# Patient Record
Sex: FEMALE | Race: WHITE | Employment: OTHER | URBAN - METROPOLITAN AREA
[De-identification: names, ages, dates, MRNs, and addresses within clinical notes are randomized per-mention and may not be internally consistent; named-entity substitution may affect disease eponyms.]

---

## 2017-03-28 ENCOUNTER — OFFICE VISIT (OUTPATIENT)
Dept: ORTHOPEDIC SURGERY | Age: 57
End: 2017-03-28

## 2017-03-28 DIAGNOSIS — M77.11 LATERAL EPICONDYLITIS OF RIGHT ELBOW: Primary | ICD-10-CM

## 2017-03-28 DIAGNOSIS — M25.521 RIGHT ELBOW PAIN: ICD-10-CM

## 2017-03-28 PROCEDURE — G8427 DOCREV CUR MEDS BY ELIG CLIN: HCPCS | Performed by: ORTHOPAEDIC SURGERY

## 2017-03-28 PROCEDURE — 20605 DRAIN/INJ JOINT/BURSA W/O US: CPT | Performed by: ORTHOPAEDIC SURGERY

## 2017-03-28 PROCEDURE — 3017F COLORECTAL CA SCREEN DOC REV: CPT | Performed by: ORTHOPAEDIC SURGERY

## 2017-03-28 PROCEDURE — 4004F PT TOBACCO SCREEN RCVD TLK: CPT | Performed by: ORTHOPAEDIC SURGERY

## 2017-03-28 PROCEDURE — G8421 BMI NOT CALCULATED: HCPCS | Performed by: ORTHOPAEDIC SURGERY

## 2017-03-28 PROCEDURE — G8484 FLU IMMUNIZE NO ADMIN: HCPCS | Performed by: ORTHOPAEDIC SURGERY

## 2017-03-28 PROCEDURE — 73070 X-RAY EXAM OF ELBOW: CPT | Performed by: ORTHOPAEDIC SURGERY

## 2017-03-28 PROCEDURE — 3014F SCREEN MAMMO DOC REV: CPT | Performed by: ORTHOPAEDIC SURGERY

## 2017-03-28 PROCEDURE — 99203 OFFICE O/P NEW LOW 30 MIN: CPT | Performed by: ORTHOPAEDIC SURGERY

## 2017-03-28 RX ORDER — LISINOPRIL AND HYDROCHLOROTHIAZIDE 20; 12.5 MG/1; MG/1
TABLET ORAL
COMMUNITY
Start: 2017-02-10 | End: 2019-01-29

## 2017-03-28 RX ORDER — CELECOXIB 200 MG/1
CAPSULE ORAL
COMMUNITY
Start: 2017-02-10 | End: 2019-01-29

## 2017-03-28 RX ORDER — CHOLECALCIFEROL (VITAMIN D3) 125 MCG
5 CAPSULE ORAL DAILY
COMMUNITY

## 2017-03-28 RX ORDER — LEVOTHYROXINE SODIUM 0.05 MG/1
TABLET ORAL
COMMUNITY
Start: 2017-02-10

## 2017-03-28 RX ORDER — ATORVASTATIN CALCIUM 40 MG/1
TABLET, FILM COATED ORAL
COMMUNITY
Start: 2017-02-10

## 2017-03-28 RX ORDER — LORAZEPAM 1 MG/1
TABLET ORAL
COMMUNITY
Start: 2017-03-23 | End: 2019-01-29

## 2017-03-28 RX ORDER — AMITRIPTYLINE HYDROCHLORIDE 50 MG/1
TABLET, FILM COATED ORAL
COMMUNITY
Start: 2017-02-21

## 2017-03-28 RX ORDER — ZONISAMIDE 100 MG/1
CAPSULE ORAL
COMMUNITY
Start: 2017-02-12

## 2017-03-28 RX ORDER — LANSOPRAZOLE 30 MG/1
CAPSULE, DELAYED RELEASE ORAL
COMMUNITY
Start: 2017-02-10

## 2017-03-28 RX ORDER — GLUCOSA SU 2KCL/CHONDROITIN SU 500-400 MG
CAPSULE ORAL
COMMUNITY

## 2017-03-28 RX ORDER — OMEPRAZOLE 20 MG/1
20 CAPSULE, DELAYED RELEASE ORAL DAILY
COMMUNITY

## 2017-03-28 RX ORDER — GABAPENTIN 800 MG/1
TABLET ORAL
COMMUNITY
Start: 2017-02-21

## 2017-03-28 RX ORDER — SUMATRIPTAN 100 MG/1
TABLET, FILM COATED ORAL
COMMUNITY
Start: 2017-03-15

## 2017-03-28 RX ORDER — HYDROCODONE BITARTRATE AND ACETAMINOPHEN 7.5; 325 MG/1; MG/1
TABLET ORAL
COMMUNITY
Start: 2017-03-23 | End: 2019-01-29

## 2017-03-28 RX ORDER — ASCORBIC ACID 500 MG
500 TABLET ORAL DAILY
COMMUNITY

## 2017-03-28 RX ORDER — PROMETHAZINE HYDROCHLORIDE 25 MG/1
TABLET ORAL
COMMUNITY
Start: 2017-03-15

## 2019-01-29 ENCOUNTER — APPOINTMENT (OUTPATIENT)
Dept: GENERAL RADIOLOGY | Age: 59
End: 2019-01-29
Payer: COMMERCIAL

## 2019-01-29 ENCOUNTER — HOSPITAL ENCOUNTER (EMERGENCY)
Age: 59
Discharge: HOME | End: 2019-01-29
Payer: COMMERCIAL

## 2019-01-29 VITALS
SYSTOLIC BLOOD PRESSURE: 162 MMHG | HEART RATE: 107 BPM | BODY MASS INDEX: 44.3 KG/M2 | DIASTOLIC BLOOD PRESSURE: 81 MMHG | OXYGEN SATURATION: 94 % | HEIGHT: 63 IN | WEIGHT: 250 LBS | RESPIRATION RATE: 18 BRPM | TEMPERATURE: 98 F

## 2019-01-29 DIAGNOSIS — S46.912A STRAIN OF LEFT SHOULDER, INITIAL ENCOUNTER: ICD-10-CM
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
## 2019-01-29 PROCEDURE — 73030 X-RAY EXAM OF SHOULDER: CPT

## 2019-01-29 PROCEDURE — 99283 EMERGENCY DEPT VISIT LOW MDM: CPT

## 2019-01-29 RX ORDER — MAGNESIUM 30 MG
30 TABLET ORAL 2 TIMES DAILY
COMMUNITY

## 2019-01-29 RX ORDER — DOCUSATE SODIUM 100 MG/1
100 CAPSULE, LIQUID FILLED ORAL 2 TIMES DAILY
COMMUNITY

## 2019-01-29 RX ORDER — ASPIRIN 325 MG
325 TABLET ORAL DAILY
COMMUNITY

## 2019-01-29 RX ORDER — BUSPIRONE HYDROCHLORIDE 15 MG/1
7.5 TABLET ORAL DAILY
COMMUNITY

## 2019-01-29 RX ORDER — TIZANIDINE 2 MG/1
TABLET ORAL
COMMUNITY
Start: 2018-04-26

## 2019-01-29 ASSESSMENT — PAIN DESCRIPTION - ORIENTATION: ORIENTATION: LEFT

## 2019-01-29 ASSESSMENT — PAIN DESCRIPTION - LOCATION
LOCATION: BACK
LOCATION: NECK;SHOULDER

## 2019-01-29 ASSESSMENT — PAIN SCALES - GENERAL
PAINLEVEL_OUTOF10: 1
PAINLEVEL_OUTOF10: 3

## 2019-01-29 ASSESSMENT — PAIN DESCRIPTION - FREQUENCY: FREQUENCY: INTERMITTENT

## 2024-01-26 ENCOUNTER — HOSPITAL ENCOUNTER (EMERGENCY)
Age: 64
Discharge: HOME | End: 2024-01-26
Payer: MEDICARE

## 2024-01-26 VITALS
WEIGHT: 231 LBS | DIASTOLIC BLOOD PRESSURE: 56 MMHG | HEART RATE: 100 BPM | OXYGEN SATURATION: 100 % | TEMPERATURE: 98.2 F | BODY MASS INDEX: 40.93 KG/M2 | RESPIRATION RATE: 16 BRPM | HEIGHT: 63 IN | SYSTOLIC BLOOD PRESSURE: 107 MMHG

## 2024-01-26 DIAGNOSIS — S39.012D BACK STRAIN, SUBSEQUENT ENCOUNTER: Primary | ICD-10-CM

## 2024-01-26 DIAGNOSIS — R11.0 NAUSEA: ICD-10-CM

## 2024-01-26 LAB
FLUAV RNA RESP QL NAA+PROBE: NOT DETECTED
FLUBV RNA RESP QL NAA+PROBE: NOT DETECTED
SARS-COV-2 RNA RESP QL NAA+PROBE: NOT DETECTED

## 2024-01-26 PROCEDURE — 87636 SARSCOV2 & INF A&B AMP PRB: CPT

## 2024-01-26 PROCEDURE — 96374 THER/PROPH/DIAG INJ IV PUSH: CPT

## 2024-01-26 PROCEDURE — 2580000003 HC RX 258: Performed by: PHYSICIAN ASSISTANT

## 2024-01-26 PROCEDURE — 6370000000 HC RX 637 (ALT 250 FOR IP): Performed by: PHYSICIAN ASSISTANT

## 2024-01-26 PROCEDURE — 6360000002 HC RX W HCPCS: Performed by: PHYSICIAN ASSISTANT

## 2024-01-26 PROCEDURE — 96361 HYDRATE IV INFUSION ADD-ON: CPT

## 2024-01-26 PROCEDURE — 99284 EMERGENCY DEPT VISIT MOD MDM: CPT

## 2024-01-26 RX ORDER — ONDANSETRON 2 MG/ML
4 INJECTION INTRAMUSCULAR; INTRAVENOUS ONCE
Status: COMPLETED | OUTPATIENT
Start: 2024-01-26 | End: 2024-01-26

## 2024-01-26 RX ORDER — 0.9 % SODIUM CHLORIDE 0.9 %
1000 INTRAVENOUS SOLUTION INTRAVENOUS ONCE
Status: COMPLETED | OUTPATIENT
Start: 2024-01-26 | End: 2024-01-26

## 2024-01-26 RX ORDER — ONDANSETRON 4 MG/1
4 TABLET, FILM COATED ORAL 3 TIMES DAILY PRN
Qty: 15 TABLET | Refills: 0 | Status: SHIPPED | OUTPATIENT
Start: 2024-01-26

## 2024-01-26 RX ORDER — LIDOCAINE 50 MG/G
1 PATCH TOPICAL DAILY
Qty: 10 PATCH | Refills: 0 | Status: SHIPPED | OUTPATIENT
Start: 2024-01-26 | End: 2024-02-05

## 2024-01-26 RX ORDER — METHOCARBAMOL 750 MG/1
750 TABLET, FILM COATED ORAL ONCE
Status: COMPLETED | OUTPATIENT
Start: 2024-01-26 | End: 2024-01-26

## 2024-01-26 RX ORDER — LIDOCAINE 4 G/G
1 PATCH TOPICAL DAILY
Status: DISCONTINUED | OUTPATIENT
Start: 2024-01-26 | End: 2024-01-26 | Stop reason: HOSPADM

## 2024-01-26 RX ADMIN — ONDANSETRON 4 MG: 2 INJECTION INTRAMUSCULAR; INTRAVENOUS at 16:28

## 2024-01-26 RX ADMIN — METHOCARBAMOL 750 MG: 750 TABLET ORAL at 16:28

## 2024-01-26 RX ADMIN — SODIUM CHLORIDE 1000 ML: 9 INJECTION, SOLUTION INTRAVENOUS at 16:25

## 2024-01-26 ASSESSMENT — PAIN SCALES - GENERAL
PAINLEVEL_OUTOF10: 2
PAINLEVEL_OUTOF10: 0
PAINLEVEL_OUTOF10: 7

## 2024-01-26 ASSESSMENT — PAIN - FUNCTIONAL ASSESSMENT
PAIN_FUNCTIONAL_ASSESSMENT: 0-10
PAIN_FUNCTIONAL_ASSESSMENT: 0-10

## 2024-01-26 ASSESSMENT — PAIN DESCRIPTION - LOCATION: LOCATION: BACK

## 2024-01-26 ASSESSMENT — PAIN DESCRIPTION - PAIN TYPE: TYPE: CHRONIC PAIN

## 2024-01-27 NOTE — ED NOTES
Ok for d/c. Edu pt and 2 family members to f/u w/ PCP re:additional testing for chronic issues. One family member asked about BLE edema. Edu pt and family re:compression and elevation. One family member stated, \"So we just leave her in pain.\" Assessed pain. Per pt, pain in \"2 to borderline 3 out of 10.\" Reiterated to family and pt that pt needs to f/u w/ PCP. All verbalized understanding. Left by wheelchair and all of pt's belongings.

## 2024-01-29 NOTE — ED PROVIDER NOTES
Vitamins (VITAMIN-B COMPLEX PO) Take by mouth      melatonin 5 MG TABS tablet Take 5 mg by mouth daily       Allergies   Allergen Reactions    Darvon [Propoxyphene]     Roxicet [Oxycodone-Acetaminophen]        REVIEW OF SYSTEMS  10 systems reviewed, pertinent positives per HPI otherwise noted to be negative    PHYSICAL EXAM  BP (!) 107/56   Pulse 100   Temp 98.2 °F (36.8 °C) (Oral)   Resp 16   Ht 1.6 m (5' 2.99\")   Wt 104.8 kg (231 lb)   SpO2 100%   BMI 40.93 kg/m²   GENERAL APPEARANCE: Awake and alert. Cooperative.  No acute distress.  Nontoxic appearance.  HEAD: Normocephalic. Atraumatic.  No davis signs or raccoon eyes.  EYES: PERRL. EOM's grossly intact.  No conjunctival injection or discharge.  No icterus.  ENT: Mucous membranes are pink and moist.   NECK: Supple.  Full range of motion with no neck pain or stiffness.  BACK: Tenderness palpation of the lumbar spine bilaterally.  Right side more painful than left.  No edema, erythema, or ecchymosis.  No palpable deformities or crepitus.  No lumbar spine tenderness or step-off.  Negative straight leg raise.  HEART: RRR. No murmurs, rubs or gallops.  Normal S1-S2.  No S3 or S4.  No tenderness palpation of the chest wall.  LUNGS: Respirations unlabored. CTAB. Good air exchange. Speaking comfortably in full sentences.   ABDOMEN: Soft. Non-distended. Non-tender. No guarding or rebound. No masses. No organomegaly.   EXTREMITIES: No peripheral edema. Moves all extremities equally. All extremities neurovascularly intact.   SKIN: Warm and dry. No acute rashes.   NEUROLOGICAL: Alert and oriented. CN's 2-12 intact. No gross facial drooping. Strength 5/5, sensation intact.   PSYCHIATRIC: Normal mood and affect.    RADIOLOGY  No results found.    ED COURSE  64-year-old female presented emergency department complaint of right-sided low back pain ongoing since mid December as well as increased fatigue and loss of taste smell.  She is currently being followed by

## 2024-02-20 ENCOUNTER — APPOINTMENT (OUTPATIENT)
Dept: GENERAL RADIOLOGY | Age: 64
End: 2024-02-20
Payer: MEDICARE

## 2024-02-20 ENCOUNTER — HOSPITAL ENCOUNTER (INPATIENT)
Age: 64
LOS: 3 days | Discharge: NURSING, REHAB OR LONG-TERM CARE | End: 2024-02-23
Attending: STUDENT IN AN ORGANIZED HEALTH CARE EDUCATION/TRAINING PROGRAM | Admitting: INTERNAL MEDICINE
Payer: MEDICARE

## 2024-02-20 ENCOUNTER — APPOINTMENT (OUTPATIENT)
Dept: CT IMAGING | Age: 64
End: 2024-02-20
Payer: MEDICARE

## 2024-02-20 DIAGNOSIS — J10.1 INFLUENZA A: ICD-10-CM

## 2024-02-20 DIAGNOSIS — N39.0 URINARY TRACT INFECTION WITHOUT HEMATURIA, SITE UNSPECIFIED: ICD-10-CM

## 2024-02-20 DIAGNOSIS — R79.89 ELEVATED TROPONIN: ICD-10-CM

## 2024-02-20 DIAGNOSIS — S09.90XA INJURY OF HEAD, INITIAL ENCOUNTER: ICD-10-CM

## 2024-02-20 DIAGNOSIS — E87.1 HYPONATREMIA: ICD-10-CM

## 2024-02-20 DIAGNOSIS — R74.01 TRANSAMINITIS: ICD-10-CM

## 2024-02-20 DIAGNOSIS — T79.6XXA TRAUMATIC RHABDOMYOLYSIS, INITIAL ENCOUNTER (HCC): ICD-10-CM

## 2024-02-20 DIAGNOSIS — R55 SYNCOPE AND COLLAPSE: Primary | ICD-10-CM

## 2024-02-20 LAB
ALBUMIN SERPL-MCNC: 3 G/DL (ref 3.4–5)
ALBUMIN/GLOB SERPL: 1.2 {RATIO} (ref 1.1–2.2)
ALP SERPL-CCNC: 209 U/L (ref 40–129)
ALT SERPL-CCNC: 58 U/L (ref 10–40)
AMORPH SED URNS QL MICRO: ABNORMAL /HPF
ANION GAP SERPL CALCULATED.3IONS-SCNC: 9 MMOL/L (ref 3–16)
AST SERPL-CCNC: 118 U/L (ref 15–37)
BACTERIA URNS QL MICRO: ABNORMAL /HPF
BASOPHILS # BLD: 0 K/UL (ref 0–0.2)
BASOPHILS NFR BLD: 0.2 %
BILIRUB SERPL-MCNC: 0.5 MG/DL (ref 0–1)
BILIRUB UR QL STRIP.AUTO: NEGATIVE
BUN SERPL-MCNC: 10 MG/DL (ref 7–20)
CALCIUM SERPL-MCNC: 8.2 MG/DL (ref 8.3–10.6)
CHLORIDE SERPL-SCNC: 94 MMOL/L (ref 99–110)
CK SERPL-CCNC: 961 U/L (ref 26–192)
CLARITY UR: CLEAR
CO2 SERPL-SCNC: 25 MMOL/L (ref 21–32)
COLOR UR: YELLOW
CREAT SERPL-MCNC: <0.5 MG/DL (ref 0.6–1.2)
CRYSTALS URNS MICRO: ABNORMAL /HPF
DEPRECATED RDW RBC AUTO: 16.8 % (ref 12.4–15.4)
EOSINOPHIL # BLD: 0 K/UL (ref 0–0.6)
EOSINOPHIL NFR BLD: 0 %
EPI CELLS #/AREA URNS HPF: ABNORMAL /HPF (ref 0–5)
FLUAV RNA RESP QL NAA+PROBE: DETECTED
FLUBV RNA RESP QL NAA+PROBE: NOT DETECTED
GFR SERPLBLD CREATININE-BSD FMLA CKD-EPI: >60 ML/MIN/{1.73_M2}
GLUCOSE SERPL-MCNC: 118 MG/DL (ref 70–99)
GLUCOSE UR STRIP.AUTO-MCNC: NEGATIVE MG/DL
HCT VFR BLD AUTO: 32.1 % (ref 36–48)
HGB BLD-MCNC: 10.5 G/DL (ref 12–16)
HGB UR QL STRIP.AUTO: ABNORMAL
KETONES UR STRIP.AUTO-MCNC: ABNORMAL MG/DL
LACTATE BLDV-SCNC: 1 MMOL/L (ref 0.4–2)
LEUKOCYTE ESTERASE UR QL STRIP.AUTO: ABNORMAL
LYMPHOCYTES # BLD: 0.5 K/UL (ref 1–5.1)
LYMPHOCYTES NFR BLD: 6.1 %
MCH RBC QN AUTO: 26.5 PG (ref 26–34)
MCHC RBC AUTO-ENTMCNC: 32.6 G/DL (ref 31–36)
MCV RBC AUTO: 81.4 FL (ref 80–100)
MONOCYTES # BLD: 0.4 K/UL (ref 0–1.3)
MONOCYTES NFR BLD: 4.5 %
NEUTROPHILS # BLD: 7.1 K/UL (ref 1.7–7.7)
NEUTROPHILS NFR BLD: 89.2 %
NITRITE UR QL STRIP.AUTO: NEGATIVE
PH UR STRIP.AUTO: 7 [PH] (ref 5–8)
PLATELET # BLD AUTO: 246 K/UL (ref 135–450)
PMV BLD AUTO: 7.8 FL (ref 5–10.5)
POTASSIUM SERPL-SCNC: 3.8 MMOL/L (ref 3.5–5.1)
PROT SERPL-MCNC: 5.6 G/DL (ref 6.4–8.2)
PROT UR STRIP.AUTO-MCNC: ABNORMAL MG/DL
RBC # BLD AUTO: 3.95 M/UL (ref 4–5.2)
RBC #/AREA URNS HPF: ABNORMAL /HPF (ref 0–4)
RENAL EPI CELLS #/AREA UR COMP ASSIST: ABNORMAL /HPF (ref 0–1)
SARS-COV-2 RNA RESP QL NAA+PROBE: NOT DETECTED
SODIUM SERPL-SCNC: 128 MMOL/L (ref 136–145)
SP GR UR STRIP.AUTO: 1.02 (ref 1–1.03)
TROPONIN, HIGH SENSITIVITY: 42 NG/L (ref 0–14)
TROPONIN, HIGH SENSITIVITY: 44 NG/L (ref 0–14)
TROPONIN, HIGH SENSITIVITY: 45 NG/L (ref 0–14)
UA COMPLETE W REFLEX CULTURE PNL UR: YES
UA DIPSTICK W REFLEX MICRO PNL UR: YES
URN SPEC COLLECT METH UR: ABNORMAL
UROBILINOGEN UR STRIP-ACNC: 0.2 E.U./DL
WBC # BLD AUTO: 8 K/UL (ref 4–11)
WBC #/AREA URNS HPF: ABNORMAL /HPF (ref 0–5)

## 2024-02-20 PROCEDURE — 87086 URINE CULTURE/COLONY COUNT: CPT

## 2024-02-20 PROCEDURE — 6370000000 HC RX 637 (ALT 250 FOR IP): Performed by: NURSE PRACTITIONER

## 2024-02-20 PROCEDURE — 6370000000 HC RX 637 (ALT 250 FOR IP): Performed by: PHYSICIAN ASSISTANT

## 2024-02-20 PROCEDURE — 1200000000 HC SEMI PRIVATE

## 2024-02-20 PROCEDURE — 6360000002 HC RX W HCPCS: Performed by: PHYSICIAN ASSISTANT

## 2024-02-20 PROCEDURE — 85025 COMPLETE CBC W/AUTO DIFF WBC: CPT

## 2024-02-20 PROCEDURE — 96365 THER/PROPH/DIAG IV INF INIT: CPT

## 2024-02-20 PROCEDURE — 93005 ELECTROCARDIOGRAM TRACING: CPT | Performed by: PHYSICIAN ASSISTANT

## 2024-02-20 PROCEDURE — 81001 URINALYSIS AUTO W/SCOPE: CPT

## 2024-02-20 PROCEDURE — 83605 ASSAY OF LACTIC ACID: CPT

## 2024-02-20 PROCEDURE — 87636 SARSCOV2 & INF A&B AMP PRB: CPT

## 2024-02-20 PROCEDURE — 71045 X-RAY EXAM CHEST 1 VIEW: CPT

## 2024-02-20 PROCEDURE — 80053 COMPREHEN METABOLIC PANEL: CPT

## 2024-02-20 PROCEDURE — 82550 ASSAY OF CK (CPK): CPT

## 2024-02-20 PROCEDURE — 99285 EMERGENCY DEPT VISIT HI MDM: CPT

## 2024-02-20 PROCEDURE — 84484 ASSAY OF TROPONIN QUANT: CPT

## 2024-02-20 PROCEDURE — 6370000000 HC RX 637 (ALT 250 FOR IP): Performed by: INTERNAL MEDICINE

## 2024-02-20 PROCEDURE — 36415 COLL VENOUS BLD VENIPUNCTURE: CPT

## 2024-02-20 PROCEDURE — 2580000003 HC RX 258: Performed by: INTERNAL MEDICINE

## 2024-02-20 PROCEDURE — 72131 CT LUMBAR SPINE W/O DYE: CPT

## 2024-02-20 PROCEDURE — 80074 ACUTE HEPATITIS PANEL: CPT

## 2024-02-20 PROCEDURE — 70450 CT HEAD/BRAIN W/O DYE: CPT

## 2024-02-20 PROCEDURE — 2580000003 HC RX 258: Performed by: PHYSICIAN ASSISTANT

## 2024-02-20 RX ORDER — OSELTAMIVIR PHOSPHATE 75 MG/1
75 CAPSULE ORAL 2 TIMES DAILY
Status: DISCONTINUED | OUTPATIENT
Start: 2024-02-20 | End: 2024-02-23 | Stop reason: HOSPADM

## 2024-02-20 RX ORDER — IBUPROFEN 400 MG/1
400 TABLET ORAL EVERY 6 HOURS PRN
Status: DISCONTINUED | OUTPATIENT
Start: 2024-02-20 | End: 2024-02-23 | Stop reason: HOSPADM

## 2024-02-20 RX ORDER — POLYETHYLENE GLYCOL 3350 17 G/17G
17 POWDER, FOR SOLUTION ORAL DAILY PRN
Status: DISCONTINUED | OUTPATIENT
Start: 2024-02-20 | End: 2024-02-23 | Stop reason: HOSPADM

## 2024-02-20 RX ORDER — SODIUM CHLORIDE 9 MG/ML
INJECTION, SOLUTION INTRAVENOUS PRN
Status: DISCONTINUED | OUTPATIENT
Start: 2024-02-20 | End: 2024-02-23 | Stop reason: HOSPADM

## 2024-02-20 RX ORDER — ONDANSETRON 2 MG/ML
4 INJECTION INTRAMUSCULAR; INTRAVENOUS EVERY 6 HOURS PRN
Status: DISCONTINUED | OUTPATIENT
Start: 2024-02-20 | End: 2024-02-23 | Stop reason: HOSPADM

## 2024-02-20 RX ORDER — LEVOTHYROXINE SODIUM 0.05 MG/1
50 TABLET ORAL DAILY
Status: DISCONTINUED | OUTPATIENT
Start: 2024-02-21 | End: 2024-02-23 | Stop reason: HOSPADM

## 2024-02-20 RX ORDER — MAGNESIUM SULFATE IN WATER 40 MG/ML
2000 INJECTION, SOLUTION INTRAVENOUS PRN
Status: DISCONTINUED | OUTPATIENT
Start: 2024-02-20 | End: 2024-02-23 | Stop reason: HOSPADM

## 2024-02-20 RX ORDER — SODIUM CHLORIDE 0.9 % (FLUSH) 0.9 %
5-40 SYRINGE (ML) INJECTION PRN
Status: DISCONTINUED | OUTPATIENT
Start: 2024-02-20 | End: 2024-02-23 | Stop reason: HOSPADM

## 2024-02-20 RX ORDER — AMITRIPTYLINE HYDROCHLORIDE 25 MG/1
50 TABLET, FILM COATED ORAL NIGHTLY PRN
Status: DISCONTINUED | OUTPATIENT
Start: 2024-02-20 | End: 2024-02-23 | Stop reason: HOSPADM

## 2024-02-20 RX ORDER — POTASSIUM CHLORIDE 20 MEQ/1
40 TABLET, EXTENDED RELEASE ORAL PRN
Status: DISCONTINUED | OUTPATIENT
Start: 2024-02-20 | End: 2024-02-21

## 2024-02-20 RX ORDER — ENOXAPARIN SODIUM 100 MG/ML
30 INJECTION SUBCUTANEOUS 2 TIMES DAILY
Status: DISCONTINUED | OUTPATIENT
Start: 2024-02-21 | End: 2024-02-23 | Stop reason: HOSPADM

## 2024-02-20 RX ORDER — ASPIRIN 325 MG
325 TABLET ORAL DAILY
Status: DISCONTINUED | OUTPATIENT
Start: 2024-02-20 | End: 2024-02-23 | Stop reason: HOSPADM

## 2024-02-20 RX ORDER — POTASSIUM CHLORIDE 7.45 MG/ML
10 INJECTION INTRAVENOUS PRN
Status: DISCONTINUED | OUTPATIENT
Start: 2024-02-20 | End: 2024-02-21

## 2024-02-20 RX ORDER — SODIUM CHLORIDE 0.9 % (FLUSH) 0.9 %
5-40 SYRINGE (ML) INJECTION EVERY 12 HOURS SCHEDULED
Status: DISCONTINUED | OUTPATIENT
Start: 2024-02-20 | End: 2024-02-23 | Stop reason: HOSPADM

## 2024-02-20 RX ORDER — 0.9 % SODIUM CHLORIDE 0.9 %
500 INTRAVENOUS SOLUTION INTRAVENOUS ONCE
Status: COMPLETED | OUTPATIENT
Start: 2024-02-20 | End: 2024-02-20

## 2024-02-20 RX ORDER — SODIUM CHLORIDE 9 MG/ML
INJECTION, SOLUTION INTRAVENOUS CONTINUOUS
Status: ACTIVE | OUTPATIENT
Start: 2024-02-20 | End: 2024-02-22

## 2024-02-20 RX ORDER — BUSPIRONE HYDROCHLORIDE 5 MG/1
7.5 TABLET ORAL DAILY
Status: DISCONTINUED | OUTPATIENT
Start: 2024-02-20 | End: 2024-02-21

## 2024-02-20 RX ORDER — PANTOPRAZOLE SODIUM 40 MG/1
40 TABLET, DELAYED RELEASE ORAL
Status: DISCONTINUED | OUTPATIENT
Start: 2024-02-21 | End: 2024-02-23 | Stop reason: HOSPADM

## 2024-02-20 RX ORDER — ONDANSETRON 4 MG/1
4 TABLET, ORALLY DISINTEGRATING ORAL EVERY 8 HOURS PRN
Status: DISCONTINUED | OUTPATIENT
Start: 2024-02-20 | End: 2024-02-23 | Stop reason: HOSPADM

## 2024-02-20 RX ORDER — HYDROCODONE BITARTRATE AND ACETAMINOPHEN 5; 325 MG/1; MG/1
1 TABLET ORAL ONCE
Status: COMPLETED | OUTPATIENT
Start: 2024-02-20 | End: 2024-02-20

## 2024-02-20 RX ORDER — MAGNESIUM 30 MG
30 TABLET ORAL 2 TIMES DAILY
Status: DISCONTINUED | OUTPATIENT
Start: 2024-02-20 | End: 2024-02-20

## 2024-02-20 RX ORDER — DOCUSATE SODIUM 100 MG/1
100 CAPSULE, LIQUID FILLED ORAL 2 TIMES DAILY
Status: DISCONTINUED | OUTPATIENT
Start: 2024-02-20 | End: 2024-02-23 | Stop reason: HOSPADM

## 2024-02-20 RX ADMIN — SODIUM CHLORIDE 500 ML: 9 INJECTION, SOLUTION INTRAVENOUS at 15:52

## 2024-02-20 RX ADMIN — AMITRIPTYLINE HYDROCHLORIDE 50 MG: 25 TABLET, FILM COATED ORAL at 22:34

## 2024-02-20 RX ADMIN — CEFTRIAXONE SODIUM 1000 MG: 1 INJECTION, POWDER, FOR SOLUTION INTRAMUSCULAR; INTRAVENOUS at 16:39

## 2024-02-20 RX ADMIN — SODIUM CHLORIDE: 9 INJECTION, SOLUTION INTRAVENOUS at 20:18

## 2024-02-20 RX ADMIN — ASPIRIN 325 MG: 325 TABLET ORAL at 21:45

## 2024-02-20 RX ADMIN — SODIUM CHLORIDE, PRESERVATIVE FREE 10 ML: 5 INJECTION INTRAVENOUS at 22:34

## 2024-02-20 RX ADMIN — DOCUSATE SODIUM 100 MG: 100 CAPSULE, LIQUID FILLED ORAL at 21:45

## 2024-02-20 RX ADMIN — OSELTAMIVIR PHOSPHATE 75 MG: 75 CAPSULE ORAL at 20:50

## 2024-02-20 RX ADMIN — IBUPROFEN 400 MG: 400 TABLET, FILM COATED ORAL at 22:31

## 2024-02-20 RX ADMIN — METOPROLOL TARTRATE 12.5 MG: 25 TABLET, FILM COATED ORAL at 22:36

## 2024-02-20 RX ADMIN — HYDROCODONE BITARTRATE AND ACETAMINOPHEN 1 TABLET: 5; 325 TABLET ORAL at 15:49

## 2024-02-20 ASSESSMENT — PAIN SCALES - GENERAL
PAINLEVEL_OUTOF10: 8
PAINLEVEL_OUTOF10: 4
PAINLEVEL_OUTOF10: 9
PAINLEVEL_OUTOF10: 8
PAINLEVEL_OUTOF10: 6
PAINLEVEL_OUTOF10: 9
PAINLEVEL_OUTOF10: 4

## 2024-02-20 ASSESSMENT — PAIN DESCRIPTION - FREQUENCY
FREQUENCY: CONTINUOUS
FREQUENCY: CONTINUOUS

## 2024-02-20 ASSESSMENT — PAIN DESCRIPTION - PAIN TYPE
TYPE: CHRONIC PAIN
TYPE: CHRONIC PAIN

## 2024-02-20 ASSESSMENT — PAIN DESCRIPTION - LOCATION
LOCATION: BACK

## 2024-02-20 ASSESSMENT — PAIN - FUNCTIONAL ASSESSMENT
PAIN_FUNCTIONAL_ASSESSMENT: PREVENTS OR INTERFERES SOME ACTIVE ACTIVITIES AND ADLS
PAIN_FUNCTIONAL_ASSESSMENT: ACTIVITIES ARE NOT PREVENTED
PAIN_FUNCTIONAL_ASSESSMENT: PREVENTS OR INTERFERES SOME ACTIVE ACTIVITIES AND ADLS
PAIN_FUNCTIONAL_ASSESSMENT: 0-10

## 2024-02-20 ASSESSMENT — PAIN DESCRIPTION - ORIENTATION
ORIENTATION: MID;LOWER
ORIENTATION: LOWER
ORIENTATION: MID

## 2024-02-20 ASSESSMENT — PAIN DESCRIPTION - DESCRIPTORS
DESCRIPTORS: ACHING

## 2024-02-20 ASSESSMENT — PAIN DESCRIPTION - ONSET
ONSET: ON-GOING
ONSET: ON-GOING

## 2024-02-20 NOTE — ED PROVIDER NOTES
[facility]  ED  EMERGENCY DEPARTMENT ENCOUNTER        Pt Name: Louann Sabillon  MRN: [id number]  Birthdate 1960  Date of evaluation: 2/20/2024  Provider: LISSETTE MARTIN PA-C  PCP: Melody Darden MD  ED Attending: MD Alhaji       I have seen and evaluated this patient with my supervising physician MD Alhaji.    CHIEF COMPLAINT:     Chief Complaint   Patient presents with    Fall     Fell last night and hit left side of head on tile floor, + LOC, has been feeling weak for past month, lives alone       HISTORY OF PRESENT ILLNESS:      History provided by the patient. No limitations.    Louann Sabillon is a 64 y.o. female who arrives to the ED by EMS from home.  Patient lives alone.  She describes a fall/possible syncopal episode that occurred at 10:30 PM yesterday.  She was in the bathroom using the bathroom prior to going to bed.  She states she fell and hit her head.  She believes the fall and head injury cause loss of consciousness but cannot be sure that she did not pass out prior to hitting her head.  She landed on tile floor and reports laying there for several hours into the morning.  She was eventually able to call for help and is brought here.  She admits that she has been feeling ill and weak for about a month no particular over the last few days.  She has had a cough.  She reports some right lower back pain that she states has been going on since December 2023 that was worse since falling.  She denies neck pain, upper back pain, chest pain or shortness of breath.  She takes baby aspirin daily but no other anticoagulants.    Nursing Notes were reviewed     REVIEW OF SYSTEMS:     Review of Systems  Positives and pertinent negatives as per HPI.      PAST MEDICAL HISTORY:     Past Medical History:   Diagnosis Date    Anxiety disorder     Depression     High blood pressure     Migraines        SURGICAL HISTORY:    History reviewed. No pertinent surgical history.    CURRENT  Normal BUN and creatinine.  Alk phos bumped at 209, ALT 58 and   Initial troponin 45, repeat 44  CK9 161  Lactic acid 1.0  UA moderate leukocytes with 10-20 WBCs and 3+ bacteria consistent with UTI  Rapid COVID and flu done.  Patient test positive for influenza A  CT head no acute intracranial abnormality  CT L-spine spondylosis.  No fracture  Portable chest x-ray no acute findings    Patient was reassessed and she remains hemodynamically stable.  She is still mildly tachycardic but outside of this vital signs are good.  She is ordered Rocephin 1 g IV for UTI.  I spoke with she and her family regarding results of her workup and our recommendation to be hospitalized.  She is agreeable with this.    Exclusion criteria - the patient is NOT to be included for SEP-1 Core Measure due to:  Viral etiology found or highly suspected (including COVID-19) without concomitant bacterial infection   Additionally, patient may have criteria for sepsis but does not meet criteria for severe sepsis or septic shock    Consults/discussion with other professionals: IP CONSULT TO CARDIOLOGY  Social determinants: None  Chronic conditions: Obesity, hypertension, hyperlipidemia, hypothyroid, migraine headaches, anxiety  Records reviewed: None    Disposition considerations/Plan: Patient here status post fall with head injury.  Unsure if patient actually passed out.  She laid on her bathroom floor for hours before being able to get help.  A workup was done to evaluate for injury/trauma along with cardiopulmonary abnormality or infection.  Imaging studies including CT head, CT L-spine and chest x-ray are normal.  EKG sinus tachycardia 104 but otherwise normal.  Her most significant abnormalities include the fact that she is positive for influenza A, has evidence of UTI, hyponatremia along with transaminitis as well as elevated CK consistent with traumatic rhabdomyolysis.  Her initial troponin is elevated but is trending down on repeat.   Patient will will need IV fluids and repeat of several of these lab abnormalities.  She is started on antibiotics for UTI.  I have consulted the hospitalist to request admission.  Employed shared decision making.     FINAL IMPRESSION:      1. Syncope and collapse    2. Injury of head, initial encounter    3. Influenza A    4. Hyponatremia    5. Urinary tract infection without hematuria, site unspecified    6. Traumatic rhabdomyolysis, initial encounter ([facility])    7. Transaminitis    8. Elevated troponin          DISPOSITION/PLAN:   DISPOSITION Admitted                   (Please note thatportions of this note were completed with a voice recognition program.  Efforts were made to edit the dictations, but occasionally words are mis-transcribed.)    DEL MAHONEYC (electronicallysigned)             Shaw Elizondo PA  02/20/24 [id number]

## 2024-02-20 NOTE — H&P
Hospital Medicine History & Physical      Date of Admission: 2/20/2024    Date of Service:  Pt seen/examined on 2/20/2023      [x]Admitted to Inpatient with expected LOS greater than two midnights due to medical therapy.  []Placed in Observation status.    Chief Admission Complaint:   fall/ syncope     Presenting Admission History:      64 y.o. female who presented to [facility] with  above c/o .  PMHx significant for hypertension hypothyroidism and anemia came to the emergency room with above complaint.  History is from patient  She syncopized in the bathroom and lost consciousness  She was able to call help after 6 hours  She hit her head and she has some pain over the left temporal region  She does not have change in mental status, fever, palpitation chest pain shortness of breath nausea vomiting diarrhea abdominal pain or any urinary complaints.  There is no focal neurological symptoms.  She thought she does have cough with white sputum   Assessment/Plan:      Current Principal Problem:  Syncope and collapse    Syncope/fall-admit, telemetry, troponin, echocardiogram, cardiology evaluation, check for orthostasis, gentle IV hydration    Mild rhabdo -IV fluids and monitor    Influenza A with cough-Tamiflu and isolation    Abnormal UA-possible UTI -started on Rocephin follow-up culture    Mild hyponatremia-normal saline and monitor sodium    Troponin is elevated-patient does not have chest pain and EKG with no acute changes other than tachycardia, possible supply demand mismatch, repeat troponin pending, telemetry, echocardiogram and cardiology eval pending    Elevated liver function test-not quite sure the cause-hold statin repeat in the morning consider ultrasound if no improvement hepatitis profile pending    Anemia-no apparent bleeding monitor    Hypertension-continue home medication    Hypothyroidism-continue Synthroid    Discussed management and the need for Hospitalization of the patient w/ the  administration of intravenous contrast. Automated exposure control, iterative reconstruction, and/or weight based adjustment of the mA/kV was utilized to reduce the radiation dose to as low as reasonably achievable.; CT of the lumbar spine was performed without the administration of intravenous contrast. Multiplanar reformatted images are provided for review.  Adjustment of mA and/or kV according to patient size was utilized.  Automated exposure control, iterative reconstruction, and/or weight based adjustment of the mA/kV was utilized to reduce the radiation dose to as low as reasonably achievable. COMPARISON: None. HISTORY: ORDERING SYSTEM PROVIDED HISTORY: fall, hit head TECHNOLOGIST PROVIDED HISTORY: Reason for exam:->fall, hit head Has a \"code stroke\" or \"stroke alert\" been called?->No Decision Support Exception - unselect if not a suspected or confirmed emergency medical condition->Emergency Medical Condition (MA) Reason for Exam: fell forward last night hitting knees and head on left side, LOC x 4-6 hrs; ORDERING SYSTEM PROVIDED HISTORY: pain s/p fall TECHNOLOGIST PROVIDED HISTORY: Reason for exam:->pain s/p fall Decision Support Exception - unselect if not a suspected or confirmed emergency medical condition->Emergency Medical Condition (MA) Reason for Exam: fell forward last night hitting knees and head on left side, LOC x 4-6 hrs. back pain FINDINGS: BRAIN/VENTRICLES: There is no acute intracranial hemorrhage, mass effect or midline shift.  No abnormal extra-axial fluid collection.  The gray-white differentiation is maintained without evidence of an acute infarct.  There is no evidence of hydrocephalus. ORBITS: The visualized portion of the orbits demonstrate no acute abnormality. SINUSES: Air-fluid levels in the maxillary sinuses.  Mucosal thickening involving the ethmoid and sphenoid air cells.  Frontal air cells and imaged mastoid air cells are well aerated. SOFT TISSUES/SKULL:  No acute abnormality of  the visualized skull or soft tissues.  Hyperostosis frontalis. CT lumbar spine: BONES/ALIGNMENT: There is normal alignment of the spine. The vertebral body heights are maintained. No osseous destructive lesion is seen. DEGENERATIVE CHANGES: Intervertebral disc space narrowing and endplate osteophyte formation at multiple levels in the lumbar spine.  Irregularity of the inferior endplate of L2 and superior endplate of L3.  Minimal vacuum phenomenon is seen in the L2-3 intervertebral disc space best seen on the coronal images. At the L2-L3 level broad-based disc bulge effaces the anterior thecal sac. There is mild central canal stenosis.  Disc bulge extending to the inferior left neural foramen results in minimal left neural foramen stenosis. At the L4-L5 level broad-based disc bulge and ligamentum flavum redundancy results in mild central canal stenosis.  No significant neural foraminal stenosis. SOFT TISSUES/RETROPERITONEUM: No acute intracranial process identified. Spondylosis of the lumbar spine.  No acute osseous abnormality identified.     No findings of acute intracranial traumatic injury. Spondylosis of the lumbar spine.  No fracture.     XR CHEST PORTABLE    Result Date: 2/20/2024  EXAMINATION: ONE XRAY VIEW OF THE CHEST 2/20/2024 2:04 pm COMPARISON: None. HISTORY: ORDERING SYSTEM PROVIDED HISTORY: fall TECHNOLOGIST PROVIDED HISTORY: Reason for exam:->fall Reason for Exam: fall FINDINGS: The lungs and costophrenic angles are clear.  There is no displaced rib fracture or pneumothorax.  The cardiomediastinal silhouette, pulmonary vessels and interstitium appear normal.     No acute findings.       PCP: Melody Darden MD    Past Medical History:        Diagnosis Date    Anxiety disorder     Depression     High blood pressure     Migraines        Past Surgical History:    History reviewed. No pertinent surgical history.    Medications Prior to Admission:   Prior to Admission medications    Medication Sig Start  mouth daily    Provider, MD Marcus       Labs: Personally reviewed and interpreted for clinical significance.   Recent Labs     02/20/24  1402   WBC 8.0   HGB 10.5*   HCT 32.1*        Recent Labs     02/20/24  1402   *   K 3.8   CL 94*   CO2 25   BUN 10   CREATININE <0.5*   CALCIUM 8.2*     Recent Labs     02/20/24  1402 02/20/24  1553   TROPHS 45* 44*     No results for input(s): \"LABA1C\" in the last 72 hours.  Recent Labs     02/20/24  1402   *   ALT 58*   BILITOT 0.5   ALKPHOS 209*     Recent Labs     02/20/24  1504   LACTA 1.0        Juana Banks MD

## 2024-02-20 NOTE — ED PROVIDER NOTES
[facility] B3 - MED SURG  EMERGENCY DEPARTMENT ENCOUNTER        Patient Name: Louann Sabillon  MRN: [id number]  Birthdate 1960  Date of evaluation: 2/20/2024  Provider: Brii Mane MD  PCP: Melody Darden MD  Note Started: 6:43 PM EST 2/20/24    I independently examined and evaluated Louann Sabillon. I personally saw the patient and performed a substantive portion of the visit including all aspects of the medical decision making.  I made/approved the management plan and take responsibility for the patient management.  I am the primary physician of record.    CHIEF COMPLAINT  Fall       HISTORY OF PRESENT ILLNESS  History from : Patient    Limitations to history : None    In brief, Louann Sabillon is a 64 y.o. female  has a past medical history of Anxiety disorder, Depression, High blood pressure, and Migraines., who presents to the ED complaining of fall.  Patient has been feeling unwell and weak for the past month.  She lives alone.  Patient has reported generalized weakness and malaise.  Patient fell last night and hit her head on the floor.  No blood thinners.  She denies other injury.  Patient states that she did lose consciousness after falling to the ground.  She was on the ground overnight.  She lives alone.      REVIEW OF SYSTEMS  All systems reviewed, pertinent positives per HPI otherwise noted to be negative.    Focused exam revealed   PHYSICAL EXAM  ED Triage Vitals [02/20/24 1347]   BP Temp Temp Source Pulse Respirations SpO2 Height Weight - Scale   (!) 147/71 98.4 °F (36.9 °C) Oral (!) 108 17 99 % -- 105.7 kg (233 lb)     GENERAL APPEARANCE: Awake and alert. Cooperative. no distress.  HENT: Normocephalic. Atraumatic. Mucous membranes are moist.  No septal hematoma, blood in the oropharynx, hemotympanums.  NECK: Supple.  Full range of motion of the neck without stiffness or pain.  No cervical spine tenderness to palpation.  EYES: PERRL. EOM's grossly intact.  HEART/CHEST: RRR. No  images such as CT, Ultrasound and MRI are read by the radiologist. Plain radiographic images are visualized and preliminarily independently interpreted by the ED Provider with the below findings:    Chest x-ray on my independent interpretation shows no evidence of pneumonia, pneumothorax, pleural effusion, pulmonary edema    Interpretation per the Radiologist below, if available at the time of this note:    CT LUMBAR SPINE WO CONTRAST   Final Result   No findings of acute intracranial traumatic injury.      Spondylosis of the lumbar spine.  No fracture.         CT HEAD WO CONTRAST   Final Result   No findings of acute intracranial traumatic injury.      Spondylosis of the lumbar spine.  No fracture.         XR CHEST PORTABLE   Final Result   No acute findings.             EMERGENCY DEPARTMENT COURSE and DIFFERENTIAL DIAGNOSIS/MDM:   Patient seen and evaluated. Old records reviewed and pertinent information included in HPI. Labs and imaging reviewed and results discussed with patient.      Overall patient, in no acute distress, presenting for generalized weakness and fall.  History obtained from patient.  Physical exam unremarkable.     Patient's pertinent external medical records: Records Reviewed : None    Chronic Medical Conditions that may contribute to presentation today:  has a past medical history of Anxiety disorder, Depression, High blood pressure, and Migraines.     Social Determinants affecting Dx or Tx:    Social History     Socioeconomic History    Marital status:      Spouse name: Not on file    Number of children: Not on file    Years of education: Not on file    Highest education level: Not on file   Occupational History    Not on file   Tobacco Use    Smoking status: Never    Smokeless tobacco: Never   Substance and Sexual Activity    Alcohol use: Never    Drug use: Not on file    Sexual activity: Not on file   Other Topics Concern    Not on file   Social History Narrative    Not on file  Syncope and collapse    2. Injury of head, initial encounter    3. Influenza A    4. Hyponatremia    5. Urinary tract infection without hematuria, site unspecified    6. Traumatic rhabdomyolysis, initial encounter ([facility])    7. Transaminitis    8. Elevated troponin          DISPOSITION/PLAN   Disposition: DISPOSITION Admitted 02/20/2024 05:37:57 PM    Condition: stable     DISCLAIMER: This chart was created using Dragon dictation software.  Efforts were made by me to ensure accuracy, however some errors may be present due to limitations of this technology and occasionally words are not transcribed correctly.    MD Alhaji Nunez Erica J, MD  02/20/24 [id number]

## 2024-02-21 LAB
ALBUMIN SERPL-MCNC: 2.3 G/DL (ref 3.4–5)
ALP SERPL-CCNC: 152 U/L (ref 40–129)
ALT SERPL-CCNC: 45 U/L (ref 10–40)
ANION GAP SERPL CALCULATED.3IONS-SCNC: 8 MMOL/L (ref 3–16)
AST SERPL-CCNC: 93 U/L (ref 15–37)
BACTERIA UR CULT: NORMAL
BASOPHILS # BLD: 0 K/UL (ref 0–0.2)
BASOPHILS NFR BLD: 0.5 %
BILIRUB DIRECT SERPL-MCNC: <0.2 MG/DL (ref 0–0.3)
BILIRUB INDIRECT SERPL-MCNC: ABNORMAL MG/DL (ref 0–1)
BILIRUB SERPL-MCNC: 0.3 MG/DL (ref 0–1)
BUN SERPL-MCNC: 7 MG/DL (ref 7–20)
CALCIUM SERPL-MCNC: 7.5 MG/DL (ref 8.3–10.6)
CHLORIDE SERPL-SCNC: 104 MMOL/L (ref 99–110)
CK SERPL-CCNC: 435 U/L (ref 26–192)
CO2 SERPL-SCNC: 25 MMOL/L (ref 21–32)
CREAT SERPL-MCNC: <0.5 MG/DL (ref 0.6–1.2)
DEPRECATED RDW RBC AUTO: 16.4 % (ref 12.4–15.4)
EKG ATRIAL RATE: 104 BPM
EKG DIAGNOSIS: NORMAL
EKG P AXIS: 45 DEGREES
EKG P-R INTERVAL: 148 MS
EKG Q-T INTERVAL: 362 MS
EKG QRS DURATION: 84 MS
EKG QTC CALCULATION (BAZETT): 476 MS
EKG R AXIS: 24 DEGREES
EKG T AXIS: 77 DEGREES
EKG VENTRICULAR RATE: 104 BPM
EOSINOPHIL # BLD: 0 K/UL (ref 0–0.6)
EOSINOPHIL NFR BLD: 0 %
GFR SERPLBLD CREATININE-BSD FMLA CKD-EPI: >60 ML/MIN/{1.73_M2}
GLUCOSE SERPL-MCNC: 99 MG/DL (ref 70–99)
HAV IGM SERPL QL IA: NORMAL
HBV CORE IGM SERPL QL IA: NORMAL
HBV SURFACE AG SERPL QL IA: NORMAL
HCT VFR BLD AUTO: 24.5 % (ref 36–48)
HCT VFR BLD AUTO: 26.9 % (ref 36–48)
HCV AB SERPL QL IA: NORMAL
HGB BLD-MCNC: 7.9 G/DL (ref 12–16)
HGB BLD-MCNC: 8.7 G/DL (ref 12–16)
LYMPHOCYTES # BLD: 0.6 K/UL (ref 1–5.1)
LYMPHOCYTES NFR BLD: 12.2 %
MAGNESIUM SERPL-MCNC: 2.3 MG/DL (ref 1.8–2.4)
MAGNESIUM SERPL-MCNC: 2.4 MG/DL (ref 1.8–2.4)
MCH RBC QN AUTO: 26.4 PG (ref 26–34)
MCHC RBC AUTO-ENTMCNC: 32.4 G/DL (ref 31–36)
MCV RBC AUTO: 81.6 FL (ref 80–100)
MONOCYTES # BLD: 0.2 K/UL (ref 0–1.3)
MONOCYTES NFR BLD: 5.3 %
NEUTROPHILS # BLD: 3.7 K/UL (ref 1.7–7.7)
NEUTROPHILS NFR BLD: 82 %
PLATELET # BLD AUTO: 156 K/UL (ref 135–450)
PMV BLD AUTO: 7.5 FL (ref 5–10.5)
POTASSIUM SERPL-SCNC: 3.2 MMOL/L (ref 3.5–5.1)
PROT SERPL-MCNC: 4.3 G/DL (ref 6.4–8.2)
RBC # BLD AUTO: 3 M/UL (ref 4–5.2)
SODIUM SERPL-SCNC: 137 MMOL/L (ref 136–145)
WBC # BLD AUTO: 4.5 K/UL (ref 4–11)

## 2024-02-21 PROCEDURE — 97530 THERAPEUTIC ACTIVITIES: CPT

## 2024-02-21 PROCEDURE — 1200000000 HC SEMI PRIVATE

## 2024-02-21 PROCEDURE — 6370000000 HC RX 637 (ALT 250 FOR IP): Performed by: INTERNAL MEDICINE

## 2024-02-21 PROCEDURE — 2580000003 HC RX 258: Performed by: INTERNAL MEDICINE

## 2024-02-21 PROCEDURE — 97162 PT EVAL MOD COMPLEX 30 MIN: CPT

## 2024-02-21 PROCEDURE — 83735 ASSAY OF MAGNESIUM: CPT

## 2024-02-21 PROCEDURE — 6370000000 HC RX 637 (ALT 250 FOR IP): Performed by: NURSE PRACTITIONER

## 2024-02-21 PROCEDURE — 85018 HEMOGLOBIN: CPT

## 2024-02-21 PROCEDURE — 85025 COMPLETE CBC W/AUTO DIFF WBC: CPT

## 2024-02-21 PROCEDURE — 82550 ASSAY OF CK (CPK): CPT

## 2024-02-21 PROCEDURE — 97116 GAIT TRAINING THERAPY: CPT

## 2024-02-21 PROCEDURE — 36415 COLL VENOUS BLD VENIPUNCTURE: CPT

## 2024-02-21 PROCEDURE — 97166 OT EVAL MOD COMPLEX 45 MIN: CPT

## 2024-02-21 PROCEDURE — 80048 BASIC METABOLIC PNL TOTAL CA: CPT

## 2024-02-21 PROCEDURE — 80076 HEPATIC FUNCTION PANEL: CPT

## 2024-02-21 PROCEDURE — 93010 ELECTROCARDIOGRAM REPORT: CPT | Performed by: INTERNAL MEDICINE

## 2024-02-21 PROCEDURE — 6360000002 HC RX W HCPCS: Performed by: INTERNAL MEDICINE

## 2024-02-21 PROCEDURE — 99223 1ST HOSP IP/OBS HIGH 75: CPT | Performed by: INTERNAL MEDICINE

## 2024-02-21 PROCEDURE — 85014 HEMATOCRIT: CPT

## 2024-02-21 RX ORDER — POTASSIUM CHLORIDE 20 MEQ/1
40 TABLET, EXTENDED RELEASE ORAL ONCE
Status: DISCONTINUED | OUTPATIENT
Start: 2024-02-21 | End: 2024-02-23 | Stop reason: HOSPADM

## 2024-02-21 RX ORDER — MECOBALAMIN 5000 MCG
10 TABLET,DISINTEGRATING ORAL NIGHTLY
Status: DISCONTINUED | OUTPATIENT
Start: 2024-02-22 | End: 2024-02-23 | Stop reason: HOSPADM

## 2024-02-21 RX ADMIN — OSELTAMIVIR PHOSPHATE 75 MG: 75 CAPSULE ORAL at 09:20

## 2024-02-21 RX ADMIN — METOPROLOL TARTRATE 12.5 MG: 25 TABLET, FILM COATED ORAL at 21:02

## 2024-02-21 RX ADMIN — SODIUM CHLORIDE, PRESERVATIVE FREE 10 ML: 5 INJECTION INTRAVENOUS at 09:26

## 2024-02-21 RX ADMIN — METOPROLOL TARTRATE 12.5 MG: 25 TABLET, FILM COATED ORAL at 09:20

## 2024-02-21 RX ADMIN — PANTOPRAZOLE SODIUM 40 MG: 40 TABLET, DELAYED RELEASE ORAL at 05:31

## 2024-02-21 RX ADMIN — CEFTRIAXONE SODIUM 1000 MG: 1 INJECTION, POWDER, FOR SOLUTION INTRAMUSCULAR; INTRAVENOUS at 16:11

## 2024-02-21 RX ADMIN — ASPIRIN 325 MG: 325 TABLET ORAL at 09:20

## 2024-02-21 RX ADMIN — ENOXAPARIN SODIUM 30 MG: 100 INJECTION SUBCUTANEOUS at 09:21

## 2024-02-21 RX ADMIN — OSELTAMIVIR PHOSPHATE 75 MG: 75 CAPSULE ORAL at 21:02

## 2024-02-21 RX ADMIN — DOCUSATE SODIUM 100 MG: 100 CAPSULE, LIQUID FILLED ORAL at 09:20

## 2024-02-21 RX ADMIN — DOCUSATE SODIUM 100 MG: 100 CAPSULE, LIQUID FILLED ORAL at 21:03

## 2024-02-21 RX ADMIN — LEVOTHYROXINE SODIUM 50 MCG: 0.05 TABLET ORAL at 05:31

## 2024-02-21 RX ADMIN — POTASSIUM CHLORIDE 40 MEQ: 1500 TABLET, EXTENDED RELEASE ORAL at 10:19

## 2024-02-21 RX ADMIN — AMITRIPTYLINE HYDROCHLORIDE 50 MG: 25 TABLET, FILM COATED ORAL at 21:02

## 2024-02-21 RX ADMIN — IBUPROFEN 400 MG: 400 TABLET, FILM COATED ORAL at 13:02

## 2024-02-21 ASSESSMENT — PAIN SCALES - GENERAL: PAINLEVEL_OUTOF10: 4

## 2024-02-21 NOTE — CONSULTS
[facility] Nurse  Consult Note       NAME:  Louann Sabillon  MEDICAL RECORD NUMBER:  [id number]  AGE: 64 y.o.   GENDER: female  : 1960  TODAY'S DATE:  2024    Subjective;Sometimes my pants legs will be wet from my legs.  Have been told to keep my legs elevated so I've been trying.   Reason for WOCN Evaluation and Assessment:        venous ulcers on RLE       Wound Care to sign off.     Louann Sabillon is a 64 y.o. female referred by:   [] Physician  [x] Nursing  [] Other:     Wound Identification:  Wound Type: venous  Contributing Factors: edema, chronic pressure, decreased mobility, and obesity    Wound History: 64 y.o. female who presented to [facility] with  above c/o .  PMHx significant for hypertension hypothyroidism and anemia came to the emergency room with above complaint.  History is from patient  She syncopized in the bathroom and lost consciousness  She was able to call help after 6 hours  She hit her head and she has some pain over the left temporal region    Current Wound Care Treatment:  alginate to left leg , bi lateral legs wrapped gauze, & ace wrap    Patient Goal of Care:  [x] Wound Healing  [] Odor Control  [] Palliative Care  [x] Pain Control   [] Other:         PAST MEDICAL HISTORY        Diagnosis Date    Anxiety disorder     Depression     High blood pressure     Migraines        PAST SURGICAL HISTORY    History reviewed. No pertinent surgical history.    FAMILY HISTORY    History reviewed. No pertinent family history.    SOCIAL HISTORY    Social History     Tobacco Use    Smoking status: Never    Smokeless tobacco: Never   Substance Use Topics    Alcohol use: Never       ALLERGIES    Allergies   Allergen Reactions    Darvon [Propoxyphene]     Roxicet [Oxycodone-Acetaminophen]        MEDICATIONS    No current facility-administered medications on file prior to encounter.     Current Outpatient Medications on File Prior to Encounter   Medication Sig  Dispense Refill    ondansetron (ZOFRAN) 4 MG tablet Take 1 tablet by mouth 3 times daily as needed for Nausea or Vomiting 15 tablet 0    metoprolol tartrate (LOPRESSOR) 25 MG tablet Take 0.5 tablets by mouth      busPIRone (BUSPAR) 15 MG tablet Take 7.5 mg by mouth daily (Patient not taking: Reported on 2/20/2024)      tiZANidine (ZANAFLEX) 2 MG tablet 2 tabs po qhs      aspirin 325 MG tablet Take 1 tablet by mouth daily      docusate sodium (COLACE) 100 MG capsule Take 1 capsule by mouth 2 times daily      magnesium 30 MG tablet Take 1 tablet by mouth 2 times daily      diclofenac (VOLTAREN) 50 MG EC tablet Take 1 tablet by mouth 2 times daily (Patient not taking: Reported on 2/20/2024) 14 tablet 0    zonisamide (ZONEGRAN) 100 MG capsule       gabapentin (NEURONTIN) 800 MG tablet       amitriptyline (ELAVIL) 50 MG tablet       Venlafaxine HCl (EFFEXOR PO) Take by mouth (Patient not taking: Reported on 2/20/2024)      levothyroxine (SYNTHROID) 50 MCG tablet       lansoprazole (PREVACID) 30 MG delayed release capsule       SUMAtriptan (IMITREX) 100 MG tablet       promethazine (PHENERGAN) 25 MG tablet       omeprazole (PRILOSEC) 20 MG delayed release capsule Take 1 capsule by mouth daily      atorvastatin (LIPITOR) 40 MG tablet       Ascorbic Acid (VITAMIN C) 500 MG tablet Take 1 tablet by mouth daily      B Complex-C-E-Zn (STRESS B/ZINC) TABS Take by mouth      B Complex Vitamins (VITAMIN-B COMPLEX PO) Take by mouth      melatonin 5 MG TABS tablet Take 1 tablet by mouth daily         Objective;sitting up in bed with lunch tray in front of her.    /62   Pulse 82   Temp 98.2 °F (36.8 °C) (Oral)   Resp 20   Ht 1.6 m (5' 2.99\")   Wt 101.3 kg (223 lb 6.4 oz)   SpO2 100%   BMI 39.59 kg/m²     LABS:  WBC:    Lab Results   Component Value Date/Time    WBC 4.5 02/21/2024 07:26 AM     H/H:    Lab Results   Component Value Date/Time    HGB 8.7 02/21/2024 12:57 PM    HCT 26.9 02/21/2024 12:57 PM     PTT:  No  results found for: \"APTT\", \"PTT\"[APTT}  PT/INR:  No results found for: \"PROTIME\", \"INR\"  HgBA1c:  No results found for: \"LABA1C\"    Assessment; posterior left leg has open moist slough areas.  Right leg bottle neck shape from edema   Martín Risk Score: Martín Scale Score: 19    Syncope and collapse    Measurements:  Wound 02/20/24 Pretibial Left;Proximal sweeling with open skin areas. some areas with slough (Active)   Wound Image    02/21/24 1322   Wound Etiology Venous 02/21/24 1322   Dressing Status New dressing applied 02/21/24 1322   Wound Cleansed Cleansed with saline 02/21/24 1322   Dressing/Treatment Xeroform;Roll gauze;Ace wrap 02/21/24 1322   Offloading for Diabetic Foot Ulcers Offloading ordered 02/21/24 1322   Dressing Change Due 02/22/24 02/21/24 1322   Wound Length (cm) 6 cm 02/21/24 1322   Wound Width (cm) 14 cm 02/21/24 1322   Wound Depth (cm) 0.1 cm 02/21/24 1322   Wound Surface Area (cm^2) 84 cm^2 02/21/24 1322   Wound Volume (cm^3) 8.4 cm^3 02/21/24 1322   Distance Tunneling (cm) 0 cm 02/21/24 1322   Tunneling Position ___ O'Clock 0 02/21/24 1322   Undermining Starts ___ O'Clock 0 02/21/24 1322   Undermining Ends___ O'Clock 0 02/21/24 1322   Undermining Maxium Distance (cm) 0 02/21/24 1322   Wound Assessment Slough;Morley/red 02/21/24 1322   Drainage Amount Scant (moist but unmeasurable) 02/21/24 1322   Drainage Description Sanguinous 02/21/24 1322   Odor None 02/21/24 1322   Mariana-wound Assessment Intact 02/20/24 2330   Margins Undefined edges 02/21/24 1322   Wound Thickness Description not for Pressure Injury Partial thickness 02/21/24 1322   Number of days: 0        Right lower leg bottle neck shape related to edema.  No open areas or redness noted.  Wrapped with roll guaze and Ace wrap.    Response to treatment:  Well tolerated by patient.     Pain Assessment:  Severity:  0 / 10  Quality of pain: N/A  Wound Pain Timing/Severity: none  Premedicated: No    Plan   Plan of Care: Wound 02/20/24

## 2024-02-21 NOTE — PROGRESS NOTES
Physical Therapy  Facility/Department: [place] [id number] - MED SURG  Physical Therapy Initial Assessment/treatment    Name: Louann Sabillon  : 1960  MRN: [id number]  Date of Service: 2024    Discharge Recommendations:  Subacute/Skilled Nursing Facility, Home with assist PRN, Home with Home health PT   PT Equipment Recommendations  Equipment Needed: No  Other: CTA      Therapy discharge recommendations take into account each patient's current medical complexities and are subject to input/oversight from the patient's healthcare team.   Barriers to Home Discharge:   [] Steps to access home entry or bed/bath:   [x] Unable to transfer, ambulate, or propel wheelchair household distances without assist   [x] Limited available assist at home upon discharge    [] Patient or family requests d/c to post-acute facility    [x] Poor cognition/safety awareness for d/c to home alone    []Unable to maintain ordered weight bearing status    [] Patient with salient signs of long-standing immobility   [x] Patient is at risk for falls    [] Other: decreased endurance    If pt is unable to be seen after this session, please let this note serve as discharge summary.  Please see case management note for discharge disposition.  Thank you.    Patient Diagnosis(es): The primary encounter diagnosis was Syncope and collapse. Diagnoses of Injury of head, initial encounter, Influenza A, Hyponatremia, Urinary tract infection without hematuria, site unspecified, Traumatic rhabdomyolysis, initial encounter ([facility]), Transaminitis, and Elevated troponin were also pertinent to this visit.  Past Medical History:  has a past medical history of Anxiety disorder, Depression, High blood pressure, and Migraines.  Past Surgical History:  has no past surgical history on file.    Assessment   Body Structures, Functions, Activity Limitations Requiring Skilled Therapeutic Intervention: Decreased functional mobility ;Decreased strength;Decreased safe  Yes  Patient assessed for rehabilitation services?: Yes  Family / Caregiver Present: No  Referring Practitioner: Juana Banks MD  Referral Date : 02/20/24  Diagnosis: syncope and collapse  Follows Commands: Within Functional Limits  General Comment  Comments: pt found in bed, RN cleared  Subjective  Subjective: \"I have a cousin that I see sometimes\"         Social/Functional History  Social/Functional History  Lives With: Alone  Type of Home: Apartment  Home Layout: One level  Home Access: Level entry  Bathroom Shower/Tub: Tub/Shower unit  Bathroom Toilet: Standard  Bathroom Equipment: Grab bars in shower, Toilet raiser  Bathroom Accessibility: Walker accessible  Home Equipment: Cane, Walker, rolling  Has the patient had two or more falls in the past year or any fall with injury in the past year?: Yes (pt reports three falls, does not know the cause)  ADL Assistance: Independent  Homemaking Assistance: Independent  Homemaking Responsibilities: Yes  Meal Prep Responsibility: Primary  Laundry Responsibility: Primary  Cleaning Responsibility: Secondary  Bill Paying/Finance Responsibility: Primary  Shopping Responsibility: Primary (orders online and picks it up or delivery services)  Health Care Management: Primary  Ambulation Assistance: Independent (with RW, uses cane for short distances)  Transfer Assistance: Independent  Active : Yes  Occupation: Retired  Leisure & Hobbies: read, watch TV, play with cats, listen to podcasts  Additional Comments: has an aide that comes every 2 weeks to assist with IADLs. Does not have 24/7 assistance available.  Vision/Hearing  Vision  Vision: Impaired  Vision Exceptions: Wears glasses for distance  Hearing  Hearing: Exceptions to WFL    Cognition   Orientation  Overall Orientation Status: Within Functional Limits  Orientation Level: Oriented X4  Cognition  Overall Cognitive Status: WFL     Objective   Pulse: 76  Heart Rate Source: Monitor  BP: (!) 112/55  BP Location:  Inpatient Mobility Raw Score : 15  AM-PAC Inpatient T-Scale Score : 39.45  Mobility Inpatient CMS 0-100% Score: 57.7  Mobility Inpatient CMS G-Code Modifier : CK             Goals  Short Term Goals  Time Frame for Short Term Goals: 2/28  Short Term Goal 1: pt will perform STS INDEP with RW  Short Term Goal 2: pt will participate in BLE exercises x10 INDEP by 2/25  Short Term Goal 3: pt will perform bed to chair transfer with INDEP and RW  Short Term Goal 4: pt will ambualte 50 ft with CGA and RW  Patient Goals   Patient Goals : \"to go home\"       Education  Patient Education  Education Given To: Patient  Education Provided: Role of Therapy;Plan of Care;Transfer Training;Equipment;Energy Conservation  Education Method: Verbal  Barriers to Learning: None  Education Outcome: Verbalized understanding      Therapy Time   Individual Concurrent Group Co-treatment   Time In       1427   Time Out       1503   Minutes       36   Timed Code Treatment Minutes: 26 Minutes (10 min eval)       Cheli Ryan, PT

## 2024-02-21 NOTE — PROGRESS NOTES
Comprehensive Nutrition Assessment    Type and Reason for Visit:  Initial, Positive Nutrition Screen    Nutrition Recommendations/Plan:   Continue regular diet and encourage PO intake   RD to add ensure BID  RD to order new updated weight  Monitor nutrition adequacy, pertinent labs, bowel habits, wt changes, and clinical progress     Malnutrition Assessment:  Malnutrition Status:  At risk for malnutrition (Comment) (02/21/24 1312)    Context:  Acute Illness     Findings of the 6 clinical characteristics of malnutrition:  Energy Intake:  Mild decrease in energy intake (Comment)  Fluid Accumulation:  Moderate to Severe Extremities    Nutrition Assessment:    Positive nutrition screen for wt loss and poor intakes: 64 y.o. f w/ PMHx for HTN, hypothyroidism and anemia admitted w/ syncope and collapse. Plan for echocardiogram, cardiology evaluation. On regular diet. Pt reports poor intake and wt loss over the past month d/t being sick and not eating as much. 1 PO intake of % of meal in EMR. No wt loss in EMR. Reports BZE=349 lb. Pt willing to trial ONS, RD to add ensure BID. Encoruaged pt to drink inbetween meals- pt compliant. Continue to encourage PO intake, will continue to monitor.    Nutrition Related Findings:    BLE + 2 pitting edema. K+ 3.2. + BM today Wound Type: Venous Stasis       Current Nutrition Intake & Therapies:    Average Meal Intake: % (1 intake)  Average Supplements Intake: None Ordered  ADULT DIET; Regular  ADULT ORAL NUTRITION SUPPLEMENT; Breakfast, Dinner; Standard High Calorie/High Protein Oral Supplement    Anthropometric Measures:  Height: 160 cm (5' 2.99\")  Ideal Body Weight (IBW): 115 lbs (52 kg)       Current Body Weight: 101.2 kg (223 lb), 193.9 % IBW. Weight Source: Standing Scale  Current BMI (kg/m2): 39.5        Weight Adjustment For: No Adjustment                 BMI Categories: Obese Class 2 (BMI 35.0 -39.9)    Estimated Daily Nutrient Needs:  Energy Requirements Based On:

## 2024-02-21 NOTE — PROGRESS NOTES
Occupational Therapy  Facility/Department: [place] [address] - MED SURG  Occupational Therapy Initial Assessment and Treatment Note    Name: Louann Sabillon  : 1960  MRN: [id number]  Date of Service: 2024    Discharge Recommendations:  Subacute/Skilled Nursing Facility (vs home  SPV and HHOT)  OT Equipment Recommendations  Equipment Needed: Yes  Mobility Devices: ADL Assistive Devices  ADL Assistive Devices: Transfer Tub Bench     Therapy discharge recommendations are subject to collaboration from the patient’s interdisciplinary healthcare team, including MD and case management recommendations.    Barriers to Home Discharge:   [] Steps to access home entry or bed/bath:   [x] Unable to transfer, ambulate, or propel wheelchair household distances without assist   [x] Limited available assist at home upon discharge    [x] Patient or family requests d/c to post-acute facility    [] Poor cognition/safety awareness for d/c to home alone    [] Unable to maintain ordered weight bearing status    [] Patient with salient signs of long-standing immobility   [x] Decreased independence with ADLs   [x] Increased risk for falls   [] Other:    If pt is unable to be seen after this session, please let this note serve as discharge summary.  Please see case management note for discharge disposition.  Thank you.    Patient Diagnosis(es): The primary encounter diagnosis was Syncope and collapse. Diagnoses of Injury of head, initial encounter, Influenza A, Hyponatremia, Urinary tract infection without hematuria, site unspecified, Traumatic rhabdomyolysis, initial encounter ([facility]), Transaminitis, and Elevated troponin were also pertinent to this visit.  Past Medical History:  has a past medical history of Anxiety disorder, Depression, High blood pressure, and Migraines.  Past Surgical History:  has no past surgical history on file.       Assessment   Performance deficits / Impairments: Decreased functional mobility ;Decreased ADL  Moderate assistance;Adaptive equipment (to R and L, bed rails used)  Supine to Sit: Moderate assistance;Minimum assistance;Assist X2;Adaptive equipment (minAx1 at trunk +modAx1 at BLE)  Sit to Supine: Other (comment) (in chair at end of session)  Scooting: Contact-guard assistance;Additional time (toward EOB)  Balance  Sitting: Intact  Standing: Impaired  Standing - Static: Fair;Constant support  Standing - Dynamic: Fair;Constant support  Transfer Training  Transfer Training: Yes  Interventions: Safety awareness training;Verbal cues;Tactile cues  Sit to Stand: Contact-guard assistance;Adaptive equipment (RW)  Stand to Sit: Contact-guard assistance;Adaptive equipment (RW)       AROM: Generally decreased, functional  Strength: Generally decreased, functional  Coordination: Generally decreased, functional  Tone: Normal  Sensation: Intact    ADL  Feeding: Independent;Beverage management  UE Dressing: Minimal assistance  UE Dressing Skilled Clinical Factors: gown mgnt  LE Dressing: Dependent/Total  LE Dressing Skilled Clinical Factors: don B socks  Toileting: Dependent/Total  Toileting Skilled Clinical Factors: purewick  Functional Mobility: Contact guard assistance  Functional Mobility Skilled Clinical Factors: CGA for ambulation within room with RW  Additional Comments: Pt declined further ADL needs                Vision  Vision: Impaired  Vision Exceptions: Wears glasses for distance  Hearing  Hearing: Exceptions to WFL    Cognition  Overall Cognitive Status: WFL  Orientation  Overall Orientation Status: Within Functional Limits  Orientation Level: Oriented X4                    Education Given To: Patient  Education Provided: Role of Therapy;Plan of Care;ADL Adaptive Strategies;Transfer Training;Equipment;Fall Prevention Strategies  Education Method: Verbal  Barriers to Learning: None  Education Outcome: Verbalized understanding;Continued education needed  Disease Specific Education: Pt educated on importance of OOB

## 2024-02-21 NOTE — CARE COORDINATION
Case Management Assessment  Initial Evaluation    Date/Time of Evaluation: 2/21/2024 3:43 PM  Assessment Completed by: Divine Velásquez RN    If patient is discharged prior to next notation, then this note serves as note for discharge by case management.    Patient Name: Louann Sabillon                   YOB: 1960  Diagnosis: Syncope and collapse [R55]  Hyponatremia [E87.1]  Influenza A [J10.1]  Transaminitis [R74.01]  Elevated troponin [R79.89]  Injury of head, initial encounter [S09.90XA]  Traumatic rhabdomyolysis, initial encounter ([facility]) [T79.6XXA]  Urinary tract infection without hematuria, site unspecified [N39.0]                   Date / Time: 2/20/2024  1:40 PM    Patient Admission Status: Inpatient   Readmission Risk (Low < 19, Mod (19-27), High > 27): Readmission Risk Score: 16.8    Current PCP: Melody Darden MD  PCP verified by CM?      Chart Reviewed: Yes      History Provided by:    Patient Orientation:      Patient Cognition:      Hospitalization in the last 30 days (Readmission):  No    If yes, Readmission Assessment in CM Navigator will be completed.    Advance Directives:      Code Status: Full Code   Patient's Primary Decision Maker is:        Discharge Planning:    Patient lives with: Alone Type of Home: Apartment  Primary Care Giver:    Patient Support Systems include: Family Members   Current Financial resources:    Current community resources:    Current services prior to admission: Transportation            Current DME:              Type of Home Care services:  None    ADLS  Prior functional level:    Current functional level:      PT AM-PAC:   /24  OT AM-PAC:   /24    Family can provide assistance at DC:    Would you like Case Management to discuss the discharge plan with any other family members/significant others, and if so, who?    Plans to Return to Present Housing:    Other Identified Issues/Barriers to RETURNING to current housing:   Potential Assistance needed at  discharge: Outpatient PT/OT            Potential DME:    Patient expects to discharge to: Apartment  Plan for transportation at discharge:      Financial    Payor: MEDICARE / Plan: MEDICARE PART A AND B / Product Type: *No Product type* /     Does insurance require precert for SNF: No    Potential assistance Purchasing Medications:    Meds-to-Beds request: Yes      [facility] - [place] - [address]  - P [phone] - F [phone]  [address]   [place] [address]  Phone: [phone] Fax: [phone]    [facility] [id number] - [place] - [address] - P [phone] - F [phone]  [address]  [place] [address]  Phone: [phone] Fax: [phone]      Notes:    Factors facilitating achievement of predicted outcomes: Family support, Motivated, Cooperative, Pleasant, and Good insight into deficits    Barriers to discharge: Decreased endurance    Additional Case Management Notes: Pt lives in a ground floor apartment alone. She has been using a cane or walker only recently. She states she is otherwise IPTA. Will continue to follow course for needs. Divine Velásquez RN     The Plan for Transition of Care is related to the following treatment goals of Syncope and collapse [R55]  Hyponatremia [E87.1]  Influenza A [J10.1]  Transaminitis [R74.01]  Elevated troponin [R79.89]  Injury of head, initial encounter [S09.90XA]  Traumatic rhabdomyolysis, initial encounter ([facility]) [T79.6XXA]  Urinary tract infection without hematuria, site unspecified [N39.0]    IF APPLICABLE: The Patient and/or patient representative Louann and her family were provided with a choice of provider and agrees with the discharge plan. Freedom of choice list with basic dialogue that supports the patient's individualized plan of care/goals and shares the quality data associated with the providers was provided to:     Patient Representative Name:       The Patient and/or Patient Representative Agree with the Discharge Plan?

## 2024-02-21 NOTE — PROGRESS NOTES
4 Eyes Skin Assessment     The patient is being assess for  Admission    I agree that 2 RN's have performed a thorough Head to Toe Skin Assessment on the patient. ALL assessment sites listed below have been assessed.       Areas assessed by both nurses: Maritza   [x]   Head, Face, and Ears   [x]   Shoulders, Back, and Chest  [x]   Arms, Elbows, and Hands   [x]   Coccyx, Sacrum, and IschIum  [x]   Legs, Feet, and Heels        Does the Patient have Skin Breakdown?  No         Martín Prevention initiated:  No   Wound Care Orders initiated:  No      [facility] nurse consulted for Pressure Injury (Stage 3,4, Unstageable, DTI, NWPT, and Complex wounds), New and Established Ostomies:  No      Nurse 1 eSignature: Electronically signed by Katia Hoyos RN on 2/20/24 at 7:17 PM EST    **SHARE this note so that the co-signing nurse is able to place an eSignature**    Nurse 2 eSignature: {Esignature:[id number]}

## 2024-02-21 NOTE — PLAN OF CARE
Problem: Discharge Planning  Goal: Discharge to home or other facility with appropriate resources  2/21/2024 1041 by Terese Grijalva RN  Outcome: Progressing  2/21/2024 0048 by Ac Nice RN  Outcome: Progressing     Problem: Pain  Goal: Verbalizes/displays adequate comfort level or baseline comfort level  2/21/2024 1041 by Terese Grijalva RN  Outcome: Progressing  2/21/2024 0048 by Ac Nice RN  Outcome: Progressing     Problem: Safety - Adult  Goal: Free from fall injury  2/21/2024 1041 by Terese Grijalva RN  Outcome: Progressing  2/21/2024 0048 by Ac Nice RN  Outcome: Progressing     Problem: Respiratory - Adult  Goal: Achieves optimal ventilation and oxygenation  2/21/2024 1041 by Terese Grijalva RN  Outcome: Progressing  2/21/2024 0048 by Ac Nice RN  Outcome: Progressing     Problem: Cardiovascular - Adult  Goal: Maintains optimal cardiac output and hemodynamic stability  2/21/2024 1041 by Terese Grijalva RN  Outcome: Progressing  2/21/2024 0048 by Ac Nice RN  Outcome: Progressing  Goal: Absence of cardiac dysrhythmias or at baseline  2/21/2024 1041 by Terese Grijalva RN  Outcome: Progressing  2/21/2024 0048 by Ac Nice RN  Outcome: Progressing

## 2024-02-21 NOTE — CONSULTS
Electrophysiology Consultation   Date: 2/21/2024  Admit Date:  2/20/2024  Reason for Consultation: Syncope and elevated troponin enzymes.  Consult Requesting Physician: Juana Banks MD     Chief Complaint   Patient presents with    Fall     Fell last night and hit left side of head on tile floor, + LOC, has been feeling weak for past month, lives alone     HPI:   Mrs. Sabillon is a pleasant 64 year old female with a medical history significant for hypothyroidism, GERD, diabetes mellitus type II and chronic pain who presents from home with reported syncope, anemia and influenza infection.  Patient is oriented x 3 however cannot describe her presentation to [facility].  According to chart, she presented to [facility] on 02/20/2024 with syncopal event while on the way to the rest room.      Patient denies fevers, chest pain, orthopnea, PND, lower extremity edema, abdominal swelling, shortness of breath, dyspnea on exertion, chills, visual changes, headaches, sore throat, cough, abdominal pain, nausea, vomiting, bleeding, bruising, dysuria, muscle/joint pain, confusion, depression, anxiety, skin lesions, etc.    Emergency Room/Hospital Course:  Patient was evaluated in the ER on 02/20/2024.  Her CBC was notable for worsening normocytic anemia.  Her CMP was notable for elevated liver enzymes and CK enzymes.  Her troponin enzymes were elevated but flat at 45.  Her EKG showed sinus tachycardia.  She was found to be influenza positive.  Electrophysiology was consulted.    Past Medical History:   Diagnosis Date    Anxiety disorder     Depression     High blood pressure     Migraines         History reviewed. No pertinent surgical history.    Allergies   Allergen Reactions    Darvon [Propoxyphene]     Roxicet [Oxycodone-Acetaminophen]        Social History:  Reviewed.  reports that she has never smoked. She has never used smokeless tobacco. She reports that she does not drink alcohol.     Family  chloride 75 mL/hr at 02/21/24 0537     PRN Meds:.sodium chloride flush, sodium chloride, potassium chloride **OR** potassium alternative oral replacement **OR** potassium chloride, magnesium sulfate, ondansetron **OR** ondansetron, polyethylene glycol, perflutren lipid microspheres, ibuprofen, amitriptyline     Assessment:   Patient Active Problem List    Diagnosis Date Noted    Syncope and collapse 02/20/2024    Lateral epicondylitis of right elbow 03/28/2017    Right elbow pain 03/28/2017      Active Hospital Problems    Diagnosis Date Noted    Syncope and collapse [R55] 02/20/2024     Mrs. Sabillon is a pleasant 64 year old female with a medical history significant for hypothyroidism, GERD, diabetes mellitus type II and chronic pain who presents from home with reported syncope, anemia and influenza infection.      Problem List:  1. Syncope.  2. Elevated troponin enzymes.  3. Anemia.  4. Influenza.   5. Urinary tract infection.    Assessment and Plan:  1. Syncope.  Patient is a pleasant 64 year old female with a medical history significant for diabetes mellitus type II, hypothyroidism and chronic pain who presents from home with reported syncope.  Patient denies syncope today.  Her telemetry was reassuring.  Her EKG was reassuring.  Suspect, anemia, urinary tract infection, and or influenza driven.  Doubt direct cardiac driven etiology.  Echocardiogram from 05/2023 and so I will cancel echocardiogram.  We would suggest a 2 week cardiac monitor.  Cardiology/electrophysiology will sign off case however we remain available to assist in any way.  - I will cancel echocardiogram.  - Avoid júnior agents.  - Treat underlying conditions.  - Cardiology/electrophysiology will sign off case.    2. Elevated troponin enzymes.  Patient with elevated troponin enzymes in setting of worsening anemia and tachycardia.  Suspect demand driven elevated troponin enzymes given lack of symptoms and flat troponin enzyme trend.    - Plan as  per above.    3. Anemia.  - Per primary team.    4. Influenza.   - Per primary team.    5. Urinary tract infection.  - Per primary team.    Thank you for allowing me to participate in the care of Louannerich Sabillon . If you have any questions/comments, please do not hesitate to contact us.    Bayron Bhakta MD  Cardiac Electrophysiology  [facility]  [phone] [facility]

## 2024-02-21 NOTE — PROGRESS NOTES
Patient admitted to room 364 from ED. AT 1845  Patient oriented to room, call light, bed rails, phone, lights and bathroom.  Patient instructed about the schedule of the day including: vital sign frequency, lab draws, possible tests, frequency of MD and staff rounds, including RN/MD rounding together at bedside, daily weights, and I &O's.  Patient instructed about prescribed diet, how to use 8MENU, and television.   bed alarm in place, patient aware of placement and reason.   Telemetry box  in place, patient aware of placement and reason.  Bed locked, in lowest position, side rails up 2/4, call light within reach.  Will continue to monitor.

## 2024-02-21 NOTE — PLAN OF CARE
Problem: Discharge Planning  Goal: Discharge to home or other facility with appropriate resources  Outcome: Progressing     Problem: Pain  Goal: Verbalizes/displays adequate comfort level or baseline comfort level  Outcome: Progressing     Problem: Safety - Adult  Goal: Free from fall injury  Outcome: Progressing     Problem: Respiratory - Adult  Goal: Achieves optimal ventilation and oxygenation  Outcome: Progressing     Problem: Cardiovascular - Adult  Goal: Maintains optimal cardiac output and hemodynamic stability  Outcome: Progressing     Problem: Cardiovascular - Adult  Goal: Absence of cardiac dysrhythmias or at baseline  Outcome: Progressing

## 2024-02-21 NOTE — ACP (ADVANCE CARE PLANNING)
Advance Care Planning     General Advance Care Planning (ACP) Conversation    Date of Conversation: 2/20/2024  Conducted with: Patient with Decision Making Capacity   Healthcare Decision Maker: Next of Kin by law (only applies in absence of above) (name) brother    Healthcare Decision Maker:  No healthcare decision makers have been documented.  Click here to complete HealthCare Decision Makers including selection of the Healthcare Decision Maker Relationship (ie \"Primary\")  Today we documented Decision Maker(s) consistent with Legal Next of Kin hierarchy.    Content/Action Overview:  Has NO ACP documents-Information provided  Reviewed DNR/DNI and patient elects Full Code (Attempt Resuscitation)      Length of Voluntary ACP Conversation in minutes:  <16 minutes (Non-Billable)    Divine Velásquez RN

## 2024-02-21 NOTE — PROGRESS NOTES
4 Eyes Admission Assessment           I agree as the admission nurse that 2 RN's have performed a thorough Head to Toe Skin Assessment on the patient. ALL assessment sites listed below have been assessed on admission.       Areas assessed by both nurses:   [x]   Head, Face, and Ears   [x]   Shoulders, Back, and Chest  [x]   Arms, Elbows, and Hands   [x]   Coccyx, Sacrum, and Ischum  [x]   Legs, Feet, and Heels        Does the Patient have Skin Breakdown?  Yes a wound was noted on the Admission Assessment and an LDA was Initiated documentation include the Mariana-wound, Wound Assessment, Measurements, Dressing Treatment, Drainage, and Color\",         Martín Prevention initiated:  Yes   Wound Care Orders initiated:  Yes      WOC nurse consulted for Pressure Injury (Stage 3,4, Unstageable, DTI, NWPT, and Complex wounds):  Yes      Nurse 1 eSignature: Electronically signed by Ac Nice RN on 2/20/24 at 11:58 PM EST    **SHARE this note so that the co-signing nurse is able to place an eSignature**    Nurse 2 eSignature: Electronically signed by Katia Hines RN on 2/21/24 at 5:08 AM EST

## 2024-02-22 LAB
ANION GAP SERPL CALCULATED.3IONS-SCNC: 10 MMOL/L (ref 3–16)
BASOPHILS # BLD: 0 K/UL (ref 0–0.2)
BASOPHILS NFR BLD: 0.5 %
BUN SERPL-MCNC: 4 MG/DL (ref 7–20)
CALCIUM SERPL-MCNC: 7.4 MG/DL (ref 8.3–10.6)
CHLORIDE SERPL-SCNC: 101 MMOL/L (ref 99–110)
CO2 SERPL-SCNC: 21 MMOL/L (ref 21–32)
CREAT SERPL-MCNC: <0.5 MG/DL (ref 0.6–1.2)
DEPRECATED RDW RBC AUTO: 17 % (ref 12.4–15.4)
EOSINOPHIL # BLD: 0 K/UL (ref 0–0.6)
EOSINOPHIL NFR BLD: 0 %
GFR SERPLBLD CREATININE-BSD FMLA CKD-EPI: >60 ML/MIN/{1.73_M2}
GLUCOSE SERPL-MCNC: 122 MG/DL (ref 70–99)
HCT VFR BLD AUTO: 26.9 % (ref 36–48)
HGB BLD-MCNC: 8.8 G/DL (ref 12–16)
LYMPHOCYTES # BLD: 0.9 K/UL (ref 1–5.1)
LYMPHOCYTES NFR BLD: 24.4 %
MAGNESIUM SERPL-MCNC: 2.2 MG/DL (ref 1.8–2.4)
MCH RBC QN AUTO: 26.7 PG (ref 26–34)
MCHC RBC AUTO-ENTMCNC: 32.8 G/DL (ref 31–36)
MCV RBC AUTO: 81.5 FL (ref 80–100)
MONOCYTES # BLD: 0.2 K/UL (ref 0–1.3)
MONOCYTES NFR BLD: 6.6 %
NEUTROPHILS # BLD: 2.6 K/UL (ref 1.7–7.7)
NEUTROPHILS NFR BLD: 68.5 %
PLATELET # BLD AUTO: 161 K/UL (ref 135–450)
PMV BLD AUTO: 8.2 FL (ref 5–10.5)
POTASSIUM SERPL-SCNC: 3.5 MMOL/L (ref 3.5–5.1)
RBC # BLD AUTO: 3.3 M/UL (ref 4–5.2)
SODIUM SERPL-SCNC: 132 MMOL/L (ref 136–145)
WBC # BLD AUTO: 3.7 K/UL (ref 4–11)

## 2024-02-22 PROCEDURE — 6370000000 HC RX 637 (ALT 250 FOR IP): Performed by: INTERNAL MEDICINE

## 2024-02-22 PROCEDURE — 85025 COMPLETE CBC W/AUTO DIFF WBC: CPT

## 2024-02-22 PROCEDURE — 97116 GAIT TRAINING THERAPY: CPT

## 2024-02-22 PROCEDURE — 6370000000 HC RX 637 (ALT 250 FOR IP): Performed by: NURSE PRACTITIONER

## 2024-02-22 PROCEDURE — 36415 COLL VENOUS BLD VENIPUNCTURE: CPT

## 2024-02-22 PROCEDURE — 6360000002 HC RX W HCPCS: Performed by: INTERNAL MEDICINE

## 2024-02-22 PROCEDURE — 2580000003 HC RX 258: Performed by: INTERNAL MEDICINE

## 2024-02-22 PROCEDURE — 97535 SELF CARE MNGMENT TRAINING: CPT

## 2024-02-22 PROCEDURE — 80048 BASIC METABOLIC PNL TOTAL CA: CPT

## 2024-02-22 PROCEDURE — 97110 THERAPEUTIC EXERCISES: CPT

## 2024-02-22 PROCEDURE — 1200000000 HC SEMI PRIVATE

## 2024-02-22 PROCEDURE — 97530 THERAPEUTIC ACTIVITIES: CPT

## 2024-02-22 PROCEDURE — 83735 ASSAY OF MAGNESIUM: CPT

## 2024-02-22 RX ORDER — OSELTAMIVIR PHOSPHATE 75 MG/1
75 CAPSULE ORAL 2 TIMES DAILY
Qty: 6 CAPSULE | Refills: 0 | DISCHARGE
Start: 2024-02-22 | End: 2024-02-23

## 2024-02-22 RX ORDER — CHOLECALCIFEROL (VITAMIN D3) 125 MCG
5 CAPSULE ORAL DAILY
Qty: 3 TABLET | Refills: 0 | Status: SHIPPED | OUTPATIENT
Start: 2024-02-22 | End: 2024-02-25

## 2024-02-22 RX ORDER — AMITRIPTYLINE HYDROCHLORIDE 50 MG/1
50 TABLET, FILM COATED ORAL NIGHTLY PRN
Qty: 30 TABLET | Refills: 3 | DISCHARGE
Start: 2024-02-22

## 2024-02-22 RX ADMIN — SODIUM CHLORIDE: 9 INJECTION, SOLUTION INTRAVENOUS at 16:45

## 2024-02-22 RX ADMIN — LEVOTHYROXINE SODIUM 50 MCG: 0.05 TABLET ORAL at 06:23

## 2024-02-22 RX ADMIN — IBUPROFEN 400 MG: 400 TABLET, FILM COATED ORAL at 21:59

## 2024-02-22 RX ADMIN — Medication 10 MG: at 00:54

## 2024-02-22 RX ADMIN — AMITRIPTYLINE HYDROCHLORIDE 50 MG: 25 TABLET, FILM COATED ORAL at 21:58

## 2024-02-22 RX ADMIN — OSELTAMIVIR PHOSPHATE 75 MG: 75 CAPSULE ORAL at 21:50

## 2024-02-22 RX ADMIN — DOCUSATE SODIUM 100 MG: 100 CAPSULE, LIQUID FILLED ORAL at 21:50

## 2024-02-22 RX ADMIN — ASPIRIN 325 MG: 325 TABLET ORAL at 08:16

## 2024-02-22 RX ADMIN — PANTOPRAZOLE SODIUM 40 MG: 40 TABLET, DELAYED RELEASE ORAL at 06:23

## 2024-02-22 RX ADMIN — SODIUM CHLORIDE: 9 INJECTION, SOLUTION INTRAVENOUS at 03:53

## 2024-02-22 RX ADMIN — SODIUM CHLORIDE, PRESERVATIVE FREE 10 ML: 5 INJECTION INTRAVENOUS at 21:51

## 2024-02-22 RX ADMIN — METOPROLOL TARTRATE 12.5 MG: 25 TABLET, FILM COATED ORAL at 21:50

## 2024-02-22 RX ADMIN — METOPROLOL TARTRATE 12.5 MG: 25 TABLET, FILM COATED ORAL at 08:16

## 2024-02-22 RX ADMIN — Medication 10 MG: at 21:50

## 2024-02-22 RX ADMIN — CEFTRIAXONE SODIUM 1000 MG: 1 INJECTION, POWDER, FOR SOLUTION INTRAMUSCULAR; INTRAVENOUS at 16:46

## 2024-02-22 RX ADMIN — OSELTAMIVIR PHOSPHATE 75 MG: 75 CAPSULE ORAL at 08:16

## 2024-02-22 RX ADMIN — DOCUSATE SODIUM 100 MG: 100 CAPSULE, LIQUID FILLED ORAL at 08:16

## 2024-02-22 NOTE — DISCHARGE SUMMARY
Hospital Medicine Discharge Summary    Patient: Louann Sabillon   : 1960     Admit Date: 2024   Discharge Date:   2024   Disposition:  []Home   []HHC  [x]SNF  []ECF  []Acute Rehab  []LTAC  []Hospice  Code status:  [x]Full  []DNR/CCA  []Limited (DNR/CCA with Do Not Intubate)  []DNRCC  Condition at Discharge: Stable  Primary Care Provider: Melody Darden MD    Admitting Provider: Juana Banks MD  Discharge Provider: Juana Banks MD     Discharge Diagnoses:      Active Hospital Problems    Diagnosis     Syncope and collapse [R55]        Presenting Admission History:      64 y.o. female who presented to [facility] with  above c/o .  PMHx significant for hypertension hypothyroidism and anemia came to the emergency room with above complaint.  History is from patient  She syncopized in the bathroom and lost consciousness  She was able to call help after 6 hours  She hit her head and she has some pain over the left temporal region  She does not have change in mental status, fever, palpitation chest pain shortness of breath nausea vomiting diarrhea abdominal pain or any urinary complaints.  There is no focal neurological symptoms.  She thought she does have cough with white sputum     Assessment/Plan:      Syncope/fall- telemetry, troponin,   cardiology evaluation appreciated ,   ok for dc      Mild rhabdo - resolved      Influenza A with cough-Tamiflu and isolation- complete total 5 days      Abnormal UA - UTI -ruled out     Mild hyponatremia- improved      Troponin is elevated-patient does not have chest pain and EKG with no acute changes other than tachycardia, possible supply demand mismatch, cardio input appreciated      Elevated liver function test-not quite sure the cause-hold statin repeat in the morning consider ultrasound if no improvement hepatitis profile  neg  Improved      Anemia-no apparent bleeding monitor, drop in Hb ,no apparent bleeding , rpt   Hold Lovenox

## 2024-02-22 NOTE — PROGRESS NOTES
Physical Therapy  Facility/Department: [facility] B3 - MED SURG  Daily Treatment Note  NAME: Louann Sabillon  : 1960  MRN: [id number]    Date of Service: 2024    Discharge Recommendations:  Home with assist PRN, Home with Home health PT   PT Equipment Recommendations  Equipment Needed: No  Other: owns RW and cane    Patient Diagnosis(es): The primary encounter diagnosis was Syncope and collapse. Diagnoses of Injury of head, initial encounter, Influenza A, Hyponatremia, Urinary tract infection without hematuria, site unspecified, Traumatic rhabdomyolysis, initial encounter (HCC), Transaminitis, and Elevated troponin were also pertinent to this visit.    Assessment   Assessment: Pt tolerated treatment session well this date, participates well with therex up to 15 reps each and able to ambulate up to 30 ft with RW and SBA with increased time to complete. Pt does require min A to perform supine to sit due to inability to reach bed rail, HHA given. Pt would continue to benefit from skilled therapy during LOS. With continued progressions, anticipate ability to return home with PRN A and HHPT at d/c.  Activity Tolerance: Patient tolerated treatment well;Patient limited by fatigue;Patient limited by endurance  Equipment Needed: No  Other: owns RW and cane     Plan    Physical Therapy Plan  General Plan: 3-5 times per week  Current Treatment Recommendations: Strengthening;ROM;Balance training;Endurance training;Functional mobility training;Transfer training;Gait training;Patient/Caregiver education & training;Equipment evaluation, education, & procurement;Home exercise program;Therapeutic activities;Co-Treatment     Restrictions  Restrictions/Precautions  Restrictions/Precautions: General Precautions, Contact Precautions  Required Braces or Orthoses?: No  Position Activity Restriction  Other position/activity restrictions: up with assist, flu precuations     Subjective    Subjective  Subjective: pt found in bed,  falls;Nurse notified;Left in chair;Gait belt  Restraints  Restraints Initially in Place: No       Goals  Short Term Goals  Time Frame for Short Term Goals: 2/28  Short Term Goal 1: pt will perform STS INDEP with RW  Short Term Goal 2: pt will participate in BLE exercises x10 INDEP by 2/25 - MET 2/22  Short Term Goal 3: pt will perform bed to chair transfer with INDEP and RW  Short Term Goal 4: pt will ambualte 50 ft with CGA and RW  Patient Goals   Patient Goals : \"to go home\"    Education  Patient Education  Education Given To: Patient  Education Provided: Role of Therapy;Plan of Care;Transfer Training;Equipment;Energy Conservation;Fall Prevention Strategies  Education Method: Verbal  Barriers to Learning: None  Education Outcome: Verbalized understanding    AM-PAC - Mobility    AM-PAC Basic Mobility - Inpatient   How much help is needed turning from your back to your side while in a flat bed without using bedrails?: A Little  How much help is needed moving from lying on your back to sitting on the side of a flat bed without using bedrails?: A Little  How much help is needed moving to and from a bed to a chair?: A Little  How much help is needed standing up from a chair using your arms?: A Little  How much help is needed walking in hospital room?: A Little  How much help is needed climbing 3-5 steps with a railing?: A Lot  AM-PAC Inpatient Mobility Raw Score : 17  AM-PAC Inpatient T-Scale Score : 42.13  Mobility Inpatient CMS 0-100% Score: 50.57  Mobility Inpatient CMS G-Code Modifier : CK         Therapy Time   Individual Concurrent Group Co-treatment   Time In 0853         Time Out 0920         Minutes 27         Timed Code Treatment Minutes: 27 Minutes       If pt is unable to be seen after this session, please let this note serve as discharge summary.  Please see case management note for discharge disposition.  Thank you.    Yosef Wright, PT, DPT

## 2024-02-22 NOTE — CARE COORDINATION
Chart reviewed day 2. Care provided by IM. Pt is from home alone. She has recs for SNF She is agreeable and would like a referral to [facility]. Referral has been made. We are trending H&H and her K was low yesterday. No labs today yet. Orders obtained by primary RN. Will continue to follow course for needs. Divine Velásquez RN

## 2024-02-22 NOTE — PROGRESS NOTES
Occupational Therapy  Facility/Department: [facility] B3 - MED SURG  Daily Treatment Note  NAME: Louann Sabillon  : 1960  MRN: [id number]    Date of Service: 2024    Discharge Recommendations:  Subacute/Skilled Nursing Facility  OT Equipment Recommendations  Equipment Needed: Yes  Mobility Devices: ADL Assistive Devices  ADL Assistive Devices: Transfer Tub Bench    Therapy discharge recommendations are subject to collaboration from the patient’s interdisciplinary healthcare team, including MD and case management recommendations.    Barriers to Home Discharge:   [] Steps to access home entry or bed/bath:   [x] Unable to transfer, ambulate, or propel wheelchair household distances without assist   [x] Limited available assist at home upon discharge    [x] Patient or family requests d/c to post-acute facility    [] Poor cognition/safety awareness for d/c to home alone    [] Unable to maintain ordered weight bearing status    [] Patient with salient signs of long-standing immobility   [x] Decreased independence with ADLs   [x] Increased risk for falls   [] Other:    If pt is unable to be seen after this session, please let this note serve as discharge summary.  Please see case management note for discharge disposition.  Thank you.    Patient Diagnosis(es): The primary encounter diagnosis was Syncope and collapse. Diagnoses of Injury of head, initial encounter, Influenza A, Hyponatremia, Urinary tract infection without hematuria, site unspecified, Traumatic rhabdomyolysis, initial encounter ([facility]), Transaminitis, and Elevated troponin were also pertinent to this visit.     Assessment    Assessment: Pt tolerated OT session fair this date, but was limited by fatigue. Pt required increased need for assistance with functional transfers this date (Edward to stand from chair, maxA to stand from toilet). She performed functional mobility from bathroom to chair, and from chair to EOB with RW and SBA. She requires maxA for

## 2024-02-22 NOTE — PLAN OF CARE
Problem: Pain  Goal: Verbalizes/displays adequate comfort level or baseline comfort level  2/22/2024 0823 by Margareth Aranda RN  Outcome: Progressing     Problem: Safety - Adult  Goal: Free from fall injury  2/22/2024 0823 by Margareth Aranda RN  Outcome: Progressing     Problem: Respiratory - Adult  Goal: Achieves optimal ventilation and oxygenation  2/22/2024 0823 by aMrgareth Aranda RN  Outcome: Progressing     Problem: Cardiovascular - Adult  Goal: Maintains optimal cardiac output and hemodynamic stability  2/22/2024 0823 by Margareth Aranda RN  Outcome: Progressing     Problem: Cardiovascular - Adult  Goal: Absence of cardiac dysrhythmias or at baseline  Outcome: Progressing

## 2024-02-22 NOTE — DISCHARGE INSTR - COC
Continuity of Care Form    Patient Name: Louann Sabillon   :  1960  MRN:  [id number]    Admit date:  2024  Discharge date:  2024    Code Status Order: Full Code   Advance Directives:     Admitting Physician:  Juana Banks MD  PCP: Melody Darden MD    Discharging Nurse: Margareth Aranda RN  Discharging Hospital Unit/Room#: 0364/0364-01  Discharging Unit Phone Number: [phone]    Emergency Contact:   Extended Emergency Contact Information  Primary Emergency Contact: Delmi Pizano  Home Phone: [phone]  Relation: Other Relative    Past Surgical History:  History reviewed. No pertinent surgical history.    Immunization History:   Immunization History   Administered Date(s) Administered    COVID-19, J&J, (age 18y+), IM, 0.5 mL 2021    COVID-19, PFIZER, ( formula), (age 12y+), IM, 30mcg/0.3mL 10/01/2023       Active Problems:  Patient Active Problem List   Diagnosis Code    Lateral epicondylitis of right elbow M77.11    Right elbow pain M25.521    Syncope and collapse R55       Isolation/Infection:   Isolation            Droplet  Contact and Droplet          Patient Infection Status       Infection Onset Added Last Indicated Last Indicated By Review Planned Expiration Resolved Resolved By    Influenza 24 COVID-19 & Influenza Combo 24                         Nurse Assessment:  Last Vital Signs: /73   Pulse 77   Temp 98.3 °F (36.8 °C) (Oral)   Resp 18   Ht 1.6 m (5' 2.99\")   Wt 101.3 kg (223 lb 6.4 oz)   SpO2 96%   BMI 39.59 kg/m²     Last documented pain score (0-10 scale): Pain Level: 4  Last Weight:   Wt Readings from Last 1 Encounters:   24 101.3 kg (223 lb 6.4 oz)     Mental Status:  oriented and alert    IV Access:  - None    Nursing Mobility/ADLs:  Walking   Assisted  Transfer  Assisted  Bathing  Assisted  Dressing  Assisted  Toileting  Assisted  Feeding  Assisted  Med Admin  Assisted  Med Delivery    Liquids  Daily Fluid Restriction: no  Last Modified Barium Swallow with Video (Video Swallowing Test): not done    Treatments at the Time of Hospital Discharge:   Respiratory Treatments:   Oxygen Therapy:  is not on home oxygen therapy.  Ventilator:    - No ventilator support    Rehab Therapies: Physical Therapy and Occupational Therapy  Weight Bearing Status/Restrictions: No weight bearing restrictions  Other Medical Equipment (for information only, NOT a DME order):  walker  Other Treatments:     Patient's personal belongings (please select all that are sent with patient):  Glasses    RN SIGNATURE:  Electronically signed by Margareth Aranda RN on 2/23/24 at 12:38 PM EST    CASE MANAGEMENT/SOCIAL WORK SECTION    Inpatient Status Date: ***    Readmission Risk Assessment Score:  Readmission Risk              Risk of Unplanned Readmission:  13           Discharging to Facility/ Agency   [facility]  Skilled Nursing  [address] [facility] [address]  [phone]     / signature: Electronically signed by Divine Velásquez RN on 2/23/24 at 10:34 AM EST    PHYSICIAN SECTION    Prognosis: Fair    Condition at Discharge: Stable    Rehab Potential (if transferring to Rehab): Good    Recommended Labs or Other Treatments After Discharge: PCP    Physician Certification: I certify the above information and transfer of Louann Sabillon  is necessary for the continuing treatment of the diagnosis listed and that she requires Skilled Nursing Facility for less 30 days.     Update Admission H&P: No change in H&P    PHYSICIAN SIGNATURE:  Electronically signed by Juana Banks MD on 2/22/24 at 9:50 AM EST

## 2024-02-22 NOTE — PLAN OF CARE
Problem: Cardiovascular - Adult  Goal: Maintains optimal cardiac output and hemodynamic stability  2/21/2024 2147 by Ac Nice RN  Outcome: Progressing  2/21/2024 1041 by Terese Grijalva RN  Outcome: Progressing     Problem: Respiratory - Adult  Goal: Achieves optimal ventilation and oxygenation  2/21/2024 2147 by Ac Nice RN  Outcome: Progressing  2/21/2024 1041 by Terese Grijalva RN  Outcome: Progressing     Problem: Safety - Adult  Goal: Free from fall injury  2/21/2024 2147 by Ac Nice RN  Outcome: Progressing     Problem: Pain  Goal: Verbalizes/displays adequate comfort level or baseline comfort level  2/21/2024 2147 by Ac Nice RN  Outcome: Progressing  2/21/2024 1041 by Terese Grijalva RN  Outcome: Progressing     Problem: Discharge Planning  Goal: Discharge to home or other facility with appropriate resources  2/21/2024 2147 by Ac Nice RN  Outcome: Progressing  2/21/2024 1041 by Terese Grijalva RN  Outcome: Progressing     Problem: Cardiovascular - Adult  Goal: Absence of cardiac dysrhythmias or at baseline  2/21/2024 1041 by Terese Grijalva RN  Outcome: Progressing     Problem: Cardiovascular - Adult  Goal: Maintains optimal cardiac output and hemodynamic stability  2/21/2024 2147 by Ac Nice RN  Outcome: Progressing  2/21/2024 1041 by Terese Grijalva RN  Outcome: Progressing     Problem: Nutrition Deficit:  Goal: Optimize nutritional status  Outcome: Progressing

## 2024-02-23 VITALS
HEIGHT: 63 IN | HEART RATE: 83 BPM | DIASTOLIC BLOOD PRESSURE: 61 MMHG | SYSTOLIC BLOOD PRESSURE: 117 MMHG | BODY MASS INDEX: 39.58 KG/M2 | TEMPERATURE: 98.2 F | RESPIRATION RATE: 18 BRPM | OXYGEN SATURATION: 98 % | WEIGHT: 223.4 LBS

## 2024-02-23 PROCEDURE — 6370000000 HC RX 637 (ALT 250 FOR IP): Performed by: INTERNAL MEDICINE

## 2024-02-23 PROCEDURE — 2580000003 HC RX 258: Performed by: INTERNAL MEDICINE

## 2024-02-23 RX ORDER — OSELTAMIVIR PHOSPHATE 75 MG/1
75 CAPSULE ORAL 2 TIMES DAILY
Qty: 4 CAPSULE | Refills: 0 | DISCHARGE
Start: 2024-02-23 | End: 2024-02-25

## 2024-02-23 RX ADMIN — OSELTAMIVIR PHOSPHATE 75 MG: 75 CAPSULE ORAL at 08:05

## 2024-02-23 RX ADMIN — PANTOPRAZOLE SODIUM 40 MG: 40 TABLET, DELAYED RELEASE ORAL at 06:47

## 2024-02-23 RX ADMIN — LEVOTHYROXINE SODIUM 50 MCG: 0.05 TABLET ORAL at 06:47

## 2024-02-23 RX ADMIN — SODIUM CHLORIDE, PRESERVATIVE FREE 10 ML: 5 INJECTION INTRAVENOUS at 08:05

## 2024-02-23 RX ADMIN — DOCUSATE SODIUM 100 MG: 100 CAPSULE, LIQUID FILLED ORAL at 08:05

## 2024-02-23 RX ADMIN — ASPIRIN 325 MG: 325 TABLET ORAL at 08:05

## 2024-02-23 RX ADMIN — METOPROLOL TARTRATE 12.5 MG: 25 TABLET, FILM COATED ORAL at 08:05

## 2024-02-23 NOTE — PLAN OF CARE
Problem: Discharge Planning  Goal: Discharge to home or other facility with appropriate resources  2/23/2024 1231 by Margareth Aranda RN  Outcome: Adequate for Discharge     Problem: Pain  Goal: Verbalizes/displays adequate comfort level or baseline comfort level  2/23/2024 1231 by Margareth Aranda RN  Outcome: Adequate for Discharge     Problem: Safety - Adult  Goal: Free from fall injury  2/23/2024 1231 by Margareth Aranda RN  Outcome: Adequate for Discharge     Problem: Respiratory - Adult  Goal: Achieves optimal ventilation and oxygenation  2/23/2024 1231 by Margareth Aranda RN  Outcome: Adequate for Discharge     Problem: Cardiovascular - Adult  Goal: Maintains optimal cardiac output and hemodynamic stability  2/23/2024 1231 by Margareth Aranda RN  Outcome: Adequate for Discharge     Problem: Cardiovascular - Adult  Goal: Absence of cardiac dysrhythmias or at baseline  2/23/2024 1231 by Margareth Aranda RN  Outcome: Adequate for Discharge     Problem: Nutrition Deficit:  Goal: Optimize nutritional status  2/23/2024 1231 by Margareth Aranda RN  Outcome: Adequate for Discharge

## 2024-02-23 NOTE — PLAN OF CARE
Problem: Discharge Planning  Goal: Discharge to home or other facility with appropriate resources  Outcome: Progressing     Problem: Pain  Goal: Verbalizes/displays adequate comfort level or baseline comfort level  Outcome: Progressing     Problem: Safety - Adult  Goal: Free from fall injury  Outcome: Progressing     Problem: Respiratory - Adult  Goal: Achieves optimal ventilation and oxygenation  Outcome: Progressing     Problem: Cardiovascular - Adult  Goal: Maintains optimal cardiac output and hemodynamic stability  Outcome: Progressing  Goal: Absence of cardiac dysrhythmias or at baseline  Outcome: Progressing     Problem: Nutrition Deficit:  Goal: Optimize nutritional status  Outcome: Progressing

## 2024-02-23 NOTE — DISCHARGE SUMMARY
Hospital Medicine Discharge Summary    Patient: Louann Sabillon   : 1960     Admit Date: 2024   Discharge Date: 2024     Disposition:  []Home   []HHC  [x]SNF  []ECF  []Acute Rehab  []LTAC  []Hospice  Code status:  [x]Full  []DNR/CCA  []Limited (DNR/CCA with Do Not Intubate)  []DNRCC  Condition at Discharge: Stable  Primary Care Provider: Melody Darden MD    Admitting Provider: Juana Banks MD  Discharge Provider: Juana Banks MD     Discharge Diagnoses:      Active Hospital Problems    Diagnosis     Syncope and collapse [R55]        Presenting Admission History:      64 y.o. female who presented to [facility] with  above c/o .  PMHx significant for hypertension hypothyroidism and anemia came to the emergency room with above complaint.  History is from patient  She syncopized in the bathroom and lost consciousness  She was able to call help after 6 hours  She hit her head and she has some pain over the left temporal region  She does not have change in mental status, fever, palpitation chest pain shortness of breath nausea vomiting diarrhea abdominal pain or any urinary complaints.  There is no focal neurological symptoms.  She thought she does have cough with white sputum     Assessment/Plan:      Syncope/fall- telemetry, troponin,   cardiology evaluation appreciated ,   ok for dc      Mild rhabdo - resolved      Influenza A with cough-Tamiflu and isolation- complete total 5 days      Abnormal UA - UTI -ruled out     Mild hyponatremia- improved      Troponin is elevated-patient does not have chest pain and EKG with no acute changes other than tachycardia, possible supply demand mismatch, cardio input appreciated      Elevated liver function test-not quite sure the cause-hold statin repeat in the morning consider ultrasound if no improvement hepatitis profile  neg  Improved      Anemia-no apparent bleeding monitor, drop in Hb ,no apparent bleeding , Hb   Disc bulge extending to the inferior left neural foramen results in minimal left neural foramen stenosis. At the L4-L5 level broad-based disc bulge and ligamentum flavum redundancy results in mild central canal stenosis.  No significant neural foraminal stenosis. SOFT TISSUES/RETROPERITONEUM: No acute intracranial process identified. Spondylosis of the lumbar spine.  No acute osseous abnormality identified.     No findings of acute intracranial traumatic injury. Spondylosis of the lumbar spine.  No fracture.     XR CHEST PORTABLE    Result Date: 2/20/2024  EXAMINATION: ONE XRAY VIEW OF THE CHEST 2/20/2024 2:04 pm COMPARISON: None. HISTORY: ORDERING SYSTEM PROVIDED HISTORY: fall TECHNOLOGIST PROVIDED HISTORY: Reason for exam:->fall Reason for Exam: fall FINDINGS: The lungs and costophrenic angles are clear.  There is no displaced rib fracture or pneumothorax.  The cardiomediastinal silhouette, pulmonary vessels and interstitium appear normal.     No acute findings.       Consults:     IP CONSULT TO CARDIOLOGY    Labs:     Recent Labs     02/20/24  1402 02/21/24  0726 02/21/24  1257 02/22/24  1403   WBC 8.0 4.5  --  3.7*   HGB 10.5* 7.9* 8.7* 8.8*   HCT 32.1* 24.5* 26.9* 26.9*    156  --  161       Recent Labs     02/20/24  1402 02/21/24  0726 02/21/24  1257 02/22/24  1403   * 137  --  132*   K 3.8 3.2*  --  3.5   CL 94* 104  --  101   CO2 25 25  --  21   BUN 10 7  --  4*   CREATININE <0.5* <0.5*  --  <0.5*   CALCIUM 8.2* 7.5*  --  7.4*   MG  --  2.30 2.40 2.20       Recent Labs     02/20/24  1402 02/20/24  1553 02/20/24  1939   TROPHS 45* 44* 42*       No results for input(s): \"LABA1C\" in the last 72 hours.  Recent Labs     02/20/24  1402 02/21/24  0726   * 93*   ALT 58* 45*   BILIDIR  --  <0.2   BILITOT 0.5 0.3   ALKPHOS 209* 152*       Recent Labs     02/20/24  1504   LACTA 1.0         Urine Cultures:   Lab Results   Component Value Date/Time    LABURIN  02/20/2024 03:04 PM     <50,000 CFU/ml  mixed skin/urogenital ashli. No further workup     Blood Cultures: No results found for: \"BC\"  No results found for: \"BLOODCULT2\"  Organism: No results found for: \"ORG\"    Signed:    Juana Banks MD

## 2024-02-23 NOTE — CARE COORDINATION
CASE MANAGEMENT DISCHARGE SUMMARY      Discharge to: [facility]    Precertification completed: N/A  Hospital Exemption Notification (HENS) completed: [id number]     IMM given: 2/22/2024    Transportation:       Medical Transport explained to pt/family. Pt/family voice no agency preference.    Agency used: [facility]   time: 1230   Ambulance form completed: Yes    Confirmed discharge plan with:     Patient: yes     Family:  yes, pt states she has spoken to her frineds and they are going to bring her some clothes to [facility]   Facility/Agency, name:  [facility] faxed   Phone number for report to facility: [phone]     RN, name: Margareth    Note: Discharging nurse to complete [facility], reconcile [facility], and place final copy with patient's discharge packet. RN to ensure that written prescriptions for  Level II medications are sent with patient to the facility as per protocol.    Divine Velásquez RN

## 2024-03-05 NOTE — PROGRESS NOTES
Physician Progress Note      PATIENT:               TORITO ARRIAZA  [facility] #:                  [id number]  :                       1960  ADMIT DATE:       2024 1:40 PM  DISCH DATE:        2024 1:56 PM  RESPONDING  PROVIDER #:        Juana Banks MD          QUERY TEXT:    Pt admitted with \" fall/possible syncopal episode and being unable to get off   the ground for several hours\" Noted documentation of   \"Traumatic   rhabdomyolysis.\"  per ED consult. If possible, please document in progress   notes and discharge summary:      The medical record reflects the following:  Risk Factors: fall on ground for several hours  Clinical Indicators:  Labs revealed CK level of 961. Patient was diagnosed   with rhabdomyolysis and was treated with IVF. CK level improved to 435. The ED   Provider noted, \"Traumatic rhabdomyolysis.\"  She synopsized in the bathroom   and lost consciousness. She was able to call help after 6 hours  Treatment: telemetry, troponin,   cardiology evaluation, check for   orthostasis, gentle IV hydration    Thank-You, Kymberly Lee RN, BSN, CCDS  Options provided:  -- Traumatic rhabdomyolysis confirmed present on admission  -- Other - I will add my own diagnosis  -- Disagree - Not applicable / Not valid  -- Disagree - Clinically unable to determine / Unknown  -- Refer to Clinical Documentation Reviewer    PROVIDER RESPONSE TEXT:    The diagnosis of Traumatic rhabdomyolysis was confirmed as present on   admission.    Query created by: Aaliyah Lee on 2024 12:10 PM      Electronically signed by:  Juana Banks MD 3/5/2024 7:03 AM

## 2024-11-14 NOTE — PROGRESS NOTES
Hospital Medicine Progress Note      Date of Admission: 2/20/2024  Hospital Day: 2    Chief Admission Complaint:  fall/ syncope       Subjective:   no new c/o , poor appetite , no change in mS , N or V  Denies CP/ SOB or dizziness     Presenting Admission History:       64 y.o. female who presented to [facility] with  above c/o .  PMHx significant for hypertension hypothyroidism and anemia came to the emergency room with above complaint.  History is from patient  She syncopized in the bathroom and lost consciousness  She was able to call help after 6 hours  She hit her head and she has some pain over the left temporal region  She does not have change in mental status, fever, palpitation chest pain shortness of breath nausea vomiting diarrhea abdominal pain or any urinary complaints.  There is no focal neurological symptoms.  She thought she does have cough with white sputum    Assessment/Plan:      Current Principal Problem:  Syncope and collapse    Syncope/fall- telemetry, troponin, echocardiogram, cardiology evaluation, check for orthostasis, gentle IV hydration     Mild rhabdo -IV fluids and monitor- improving      Influenza A with cough-Tamiflu and isolation     Abnormal UA-possible UTI -started on Rocephin follow-up culture     Mild hyponatremia-normal saline and monitor sodium     Troponin is elevated-patient does not have chest pain and EKG with no acute changes other than tachycardia, possible supply demand mismatch, repeat troponin pending, telemetry, echocardiogram and cardiology eval pending     Elevated liver function test-not quite sure the cause-hold statin repeat in the morning consider ultrasound if no improvement hepatitis profile  neg  Improving      Anemia-no apparent bleeding monitor, drop in Hb ,no apparent bleeding , rpt   Hold Lovenox      Hypertension-continue home medication     Hypothyroidism-continue Synthroid    Hypokalemia - replace , check mag     Physical Exam Performed:   Clinic hours for Dr. Martinez:    Monday 8am - 5pm  Tuesday 8am - 5pm  Wednesday 9am - 5pm  Thursday 8am - 5pm  Friday  8am - 1pm    If you need a refill on your prescription, please call your pharmacy and let them know. Please be proactive and call before your medication runs out. The pharmacy will then contact us for the refill. Please allow 24-48 hours for the refill to be processed.     If your physician has ordered additional laboratory or radiology testing as part of your ongoing plan of care, please allow 5-7 business days from the day of your lab draw or test for the results to be sent and reviewed by your provider. If your results are critical and require more immediate intervention, you will be contacted sooner. Your results will be conveyed to you via a phone call or letter.    You may be receiving a patient satisfaction survey in the mail or in your email. If you receive an email survey, please look for the subject line of: \" Your provider name\" would like your feedback\". Please take the time to complete your survey either via the mail or email, as your feedback is very important to us. We strive to make your experience exceptional ad your comments help us with that goal. We look forward to hearing from you.         of the case with:    [x] Telemetry personally reviewed and interpreted as documented above    [] Imaging personally reviewed and interpreted, includes:    [x] Data Review (any 3)  [] Collateral history obtained from:    [x] All available Consultant notes from yesterday/today were reviewed  [x] All current labs were reviewed and interpreted for clinical significance   [x] Appropriate follow-up labs were ordered    Medications:  Personally reviewed in detail in conjunction w/ labs as documented for evidence of drug toxicity.     Infusion Medications    sodium chloride      sodium chloride 75 mL/hr at 02/21/24 0537     Scheduled Medications    aspirin  325 mg Oral Daily    busPIRone  7.5 mg Oral Daily    docusate sodium  100 mg Oral BID    levothyroxine  50 mcg Oral Daily    metoprolol tartrate  12.5 mg Oral BID    pantoprazole  40 mg Oral QAM AC    sodium chloride flush  5-40 mL IntraVENous 2 times per day    enoxaparin  30 mg SubCUTAneous BID    cefTRIAXone (ROCEPHIN) IV  1,000 mg IntraVENous Q24H    oseltamivir  75 mg Oral BID     PRN Meds: sodium chloride flush, sodium chloride, potassium chloride **OR** potassium alternative oral replacement **OR** potassium chloride, magnesium sulfate, ondansetron **OR** ondansetron, polyethylene glycol, perflutren lipid microspheres, ibuprofen, amitriptyline     Labs:  Personally reviewed and interpreted for clinical significance.     Recent Labs     02/20/24  1402 02/21/24  0726   WBC 8.0 4.5   HGB 10.5* 7.9*   HCT 32.1* 24.5*    156     Recent Labs     02/20/24  1402 02/21/24  0726   * 137   K 3.8 3.2*   CL 94* 104   CO2 25 25   BUN 10 7   CREATININE <0.5* <0.5*   CALCIUM 8.2* 7.5*   MG  --  2.30     Recent Labs     02/20/24  1402 02/20/24  1553 02/20/24  1939   TROPHS 45* 44* 42*     No results for input(s): \"LABA1C\" in the last 72 hours.  Recent Labs     02/20/24  1402 02/21/24  0726   * 93*   ALT 58* 45*   BILIDIR  --  <0.2   BILITOT 0.5 0.3   ALKPHOS  209* 152*     Recent Labs     02/20/24  1504   LACTA 1.0       Urine Cultures: No results found for: \"LABURIN\"  Blood Cultures: No results found for: \"BC\"  No results found for: \"BLOODCULT2\"  Organism: No results found for: \"ORG\"      Juana Banks MD